# Patient Record
Sex: FEMALE | Race: OTHER | HISPANIC OR LATINO
[De-identification: names, ages, dates, MRNs, and addresses within clinical notes are randomized per-mention and may not be internally consistent; named-entity substitution may affect disease eponyms.]

---

## 2017-04-10 ENCOUNTER — TRANSCRIPTION ENCOUNTER (OUTPATIENT)
Age: 27
End: 2017-04-10

## 2017-08-29 ENCOUNTER — TRANSCRIPTION ENCOUNTER (OUTPATIENT)
Age: 27
End: 2017-08-29

## 2017-09-02 ENCOUNTER — TRANSCRIPTION ENCOUNTER (OUTPATIENT)
Age: 27
End: 2017-09-02

## 2018-01-01 ENCOUNTER — TRANSCRIPTION ENCOUNTER (OUTPATIENT)
Age: 28
End: 2018-01-01

## 2018-03-01 ENCOUNTER — OUTPATIENT (OUTPATIENT)
Dept: OUTPATIENT SERVICES | Facility: HOSPITAL | Age: 28
LOS: 1 days | End: 2018-03-01

## 2018-03-16 DIAGNOSIS — R69 ILLNESS, UNSPECIFIED: ICD-10-CM

## 2018-10-01 ENCOUNTER — OUTPATIENT (OUTPATIENT)
Dept: OUTPATIENT SERVICES | Facility: HOSPITAL | Age: 28
LOS: 1 days | End: 2018-10-01
Payer: MEDICAID

## 2018-10-01 PROCEDURE — G9001: CPT

## 2018-10-08 DIAGNOSIS — Z71.89 OTHER SPECIFIED COUNSELING: ICD-10-CM

## 2020-03-19 PROBLEM — Z00.00 ENCOUNTER FOR PREVENTIVE HEALTH EXAMINATION: Status: ACTIVE | Noted: 2020-03-19

## 2020-06-15 ENCOUNTER — APPOINTMENT (OUTPATIENT)
Dept: OBGYN | Facility: CLINIC | Age: 30
End: 2020-06-15
Payer: MEDICAID

## 2020-06-15 VITALS
HEIGHT: 61 IN | WEIGHT: 215 LBS | DIASTOLIC BLOOD PRESSURE: 76 MMHG | BODY MASS INDEX: 40.59 KG/M2 | RESPIRATION RATE: 16 BRPM | TEMPERATURE: 98.7 F | SYSTOLIC BLOOD PRESSURE: 118 MMHG

## 2020-06-15 DIAGNOSIS — Z87.42 PERSONAL HISTORY OF OTHER DISEASES OF THE FEMALE GENITAL TRACT: ICD-10-CM

## 2020-06-15 DIAGNOSIS — G43.909 MIGRAINE, UNSPECIFIED, NOT INTRACTABLE, W/OUT STATUS MIGRAINOSUS: ICD-10-CM

## 2020-06-15 DIAGNOSIS — Z12.72 ENCOUNTER FOR SCREENING FOR MALIGNANT NEOPLASM OF VAGINA: ICD-10-CM

## 2020-06-15 DIAGNOSIS — Z86.39 PERSONAL HISTORY OF OTHER ENDOCRINE, NUTRITIONAL AND METABOLIC DISEASE: ICD-10-CM

## 2020-06-15 DIAGNOSIS — Z86.79 PERSONAL HISTORY OF OTHER DISEASES OF THE CIRCULATORY SYSTEM: ICD-10-CM

## 2020-06-15 DIAGNOSIS — Z86.018 PERSONAL HISTORY OF OTHER BENIGN NEOPLASM: ICD-10-CM

## 2020-06-15 DIAGNOSIS — Z78.9 OTHER SPECIFIED HEALTH STATUS: ICD-10-CM

## 2020-06-15 PROCEDURE — 99385 PREV VISIT NEW AGE 18-39: CPT

## 2020-06-15 PROCEDURE — 99204 OFFICE O/P NEW MOD 45 MIN: CPT | Mod: 25

## 2020-06-15 NOTE — PHYSICAL EXAM
[Awake] : awake [Alert] : alert [Acute Distress] : no acute distress [Nipple Discharge] : no nipple discharge [Mass] : no breast mass [Axillary LAD] : no axillary lymphadenopathy [Soft] : soft [Oriented x3] : oriented to person, place, and time [Tender] : non tender [Normal] : uterus [No Bleeding] : there was no active vaginal bleeding [Tenderness] : tender [Enlarged ___ wks] : enlarged [unfilled] ~Uweeks [Uterine Adnexae] : were not tender and not enlarged

## 2020-06-27 ENCOUNTER — APPOINTMENT (OUTPATIENT)
Dept: MRI IMAGING | Facility: CLINIC | Age: 30
End: 2020-06-27
Payer: MEDICAID

## 2020-06-27 ENCOUNTER — OUTPATIENT (OUTPATIENT)
Dept: OUTPATIENT SERVICES | Facility: HOSPITAL | Age: 30
LOS: 1 days | End: 2020-06-27

## 2020-06-27 DIAGNOSIS — D25.1 INTRAMURAL LEIOMYOMA OF UTERUS: ICD-10-CM

## 2020-06-27 PROCEDURE — 72197 MRI PELVIS W/O & W/DYE: CPT | Mod: 26

## 2020-06-29 ENCOUNTER — APPOINTMENT (OUTPATIENT)
Dept: OBGYN | Facility: CLINIC | Age: 30
End: 2020-06-29
Payer: MEDICAID

## 2020-06-29 VITALS
SYSTOLIC BLOOD PRESSURE: 110 MMHG | TEMPERATURE: 98.6 F | WEIGHT: 215 LBS | HEIGHT: 61 IN | BODY MASS INDEX: 40.59 KG/M2 | RESPIRATION RATE: 16 BRPM | DIASTOLIC BLOOD PRESSURE: 70 MMHG

## 2020-06-29 PROCEDURE — 99215 OFFICE O/P EST HI 40 MIN: CPT | Mod: 57

## 2020-07-10 ENCOUNTER — TRANSCRIPTION ENCOUNTER (OUTPATIENT)
Age: 30
End: 2020-07-10

## 2020-09-08 ENCOUNTER — OUTPATIENT (OUTPATIENT)
Dept: OUTPATIENT SERVICES | Facility: HOSPITAL | Age: 30
LOS: 1 days | End: 2020-09-08
Payer: MEDICAID

## 2020-09-08 VITALS
WEIGHT: 216.93 LBS | DIASTOLIC BLOOD PRESSURE: 51 MMHG | HEIGHT: 61 IN | TEMPERATURE: 99 F | SYSTOLIC BLOOD PRESSURE: 112 MMHG | OXYGEN SATURATION: 98 % | RESPIRATION RATE: 16 BRPM | HEART RATE: 72 BPM

## 2020-09-08 DIAGNOSIS — Z98.891 HISTORY OF UTERINE SCAR FROM PREVIOUS SURGERY: Chronic | ICD-10-CM

## 2020-09-08 DIAGNOSIS — N80.0 ENDOMETRIOSIS OF UTERUS: ICD-10-CM

## 2020-09-08 DIAGNOSIS — Z01.818 ENCOUNTER FOR OTHER PREPROCEDURAL EXAMINATION: ICD-10-CM

## 2020-09-08 DIAGNOSIS — Z86.79 PERSONAL HISTORY OF OTHER DISEASES OF THE CIRCULATORY SYSTEM: Chronic | ICD-10-CM

## 2020-09-08 LAB
A1C WITH ESTIMATED AVERAGE GLUCOSE RESULT: 5.6 % — SIGNIFICANT CHANGE UP (ref 4–5.6)
ANION GAP SERPL CALC-SCNC: 6 MMOL/L — SIGNIFICANT CHANGE UP (ref 5–17)
APPEARANCE UR: CLEAR — SIGNIFICANT CHANGE UP
BASOPHILS # BLD AUTO: 0.06 K/UL — SIGNIFICANT CHANGE UP (ref 0–0.2)
BASOPHILS NFR BLD AUTO: 0.8 % — SIGNIFICANT CHANGE UP (ref 0–2)
BILIRUB UR-MCNC: NEGATIVE — SIGNIFICANT CHANGE UP
BUN SERPL-MCNC: 12 MG/DL — SIGNIFICANT CHANGE UP (ref 7–23)
CALCIUM SERPL-MCNC: 8.6 MG/DL — SIGNIFICANT CHANGE UP (ref 8.5–10.1)
CHLORIDE SERPL-SCNC: 107 MMOL/L — SIGNIFICANT CHANGE UP (ref 96–108)
CO2 SERPL-SCNC: 24 MMOL/L — SIGNIFICANT CHANGE UP (ref 22–31)
COLOR SPEC: YELLOW — SIGNIFICANT CHANGE UP
CREAT SERPL-MCNC: 0.64 MG/DL — SIGNIFICANT CHANGE UP (ref 0.5–1.3)
DIFF PNL FLD: ABNORMAL
EOSINOPHIL # BLD AUTO: 0.12 K/UL — SIGNIFICANT CHANGE UP (ref 0–0.5)
EOSINOPHIL NFR BLD AUTO: 1.6 % — SIGNIFICANT CHANGE UP (ref 0–6)
ESTIMATED AVERAGE GLUCOSE: 114 MG/DL — SIGNIFICANT CHANGE UP (ref 68–114)
GLUCOSE SERPL-MCNC: 82 MG/DL — SIGNIFICANT CHANGE UP (ref 70–99)
GLUCOSE UR QL: NEGATIVE MG/DL — SIGNIFICANT CHANGE UP
HCT VFR BLD CALC: 40.4 % — SIGNIFICANT CHANGE UP (ref 34.5–45)
HGB BLD-MCNC: 12.8 G/DL — SIGNIFICANT CHANGE UP (ref 11.5–15.5)
IMM GRANULOCYTES NFR BLD AUTO: 0.4 % — SIGNIFICANT CHANGE UP (ref 0–1.5)
KETONES UR-MCNC: NEGATIVE — SIGNIFICANT CHANGE UP
LEUKOCYTE ESTERASE UR-ACNC: NEGATIVE — SIGNIFICANT CHANGE UP
LYMPHOCYTES # BLD AUTO: 1.45 K/UL — SIGNIFICANT CHANGE UP (ref 1–3.3)
LYMPHOCYTES # BLD AUTO: 19.9 % — SIGNIFICANT CHANGE UP (ref 13–44)
MCHC RBC-ENTMCNC: 30 PG — SIGNIFICANT CHANGE UP (ref 27–34)
MCHC RBC-ENTMCNC: 31.7 GM/DL — LOW (ref 32–36)
MCV RBC AUTO: 94.6 FL — SIGNIFICANT CHANGE UP (ref 80–100)
MONOCYTES # BLD AUTO: 0.62 K/UL — SIGNIFICANT CHANGE UP (ref 0–0.9)
MONOCYTES NFR BLD AUTO: 8.5 % — SIGNIFICANT CHANGE UP (ref 2–14)
NEUTROPHILS # BLD AUTO: 5.01 K/UL — SIGNIFICANT CHANGE UP (ref 1.8–7.4)
NEUTROPHILS NFR BLD AUTO: 68.8 % — SIGNIFICANT CHANGE UP (ref 43–77)
NITRITE UR-MCNC: NEGATIVE — SIGNIFICANT CHANGE UP
PH UR: 7 — SIGNIFICANT CHANGE UP (ref 5–8)
PLATELET # BLD AUTO: 355 K/UL — SIGNIFICANT CHANGE UP (ref 150–400)
POTASSIUM SERPL-MCNC: 4.2 MMOL/L — SIGNIFICANT CHANGE UP (ref 3.5–5.3)
POTASSIUM SERPL-SCNC: 4.2 MMOL/L — SIGNIFICANT CHANGE UP (ref 3.5–5.3)
PROT UR-MCNC: NEGATIVE MG/DL — SIGNIFICANT CHANGE UP
RBC # BLD: 4.27 M/UL — SIGNIFICANT CHANGE UP (ref 3.8–5.2)
RBC # FLD: 14.1 % — SIGNIFICANT CHANGE UP (ref 10.3–14.5)
SODIUM SERPL-SCNC: 137 MMOL/L — SIGNIFICANT CHANGE UP (ref 135–145)
SP GR SPEC: 1.01 — SIGNIFICANT CHANGE UP (ref 1.01–1.02)
UROBILINOGEN FLD QL: NEGATIVE MG/DL — SIGNIFICANT CHANGE UP
WBC # BLD: 7.29 K/UL — SIGNIFICANT CHANGE UP (ref 3.8–10.5)
WBC # FLD AUTO: 7.29 K/UL — SIGNIFICANT CHANGE UP (ref 3.8–10.5)

## 2020-09-08 PROCEDURE — 85025 COMPLETE CBC W/AUTO DIFF WBC: CPT

## 2020-09-08 PROCEDURE — G0463: CPT | Mod: 25

## 2020-09-08 PROCEDURE — 86850 RBC ANTIBODY SCREEN: CPT

## 2020-09-08 PROCEDURE — 81001 URINALYSIS AUTO W/SCOPE: CPT

## 2020-09-08 PROCEDURE — 36415 COLL VENOUS BLD VENIPUNCTURE: CPT

## 2020-09-08 PROCEDURE — 80048 BASIC METABOLIC PNL TOTAL CA: CPT

## 2020-09-08 PROCEDURE — 83036 HEMOGLOBIN GLYCOSYLATED A1C: CPT

## 2020-09-08 PROCEDURE — 86901 BLOOD TYPING SEROLOGIC RH(D): CPT

## 2020-09-08 PROCEDURE — 86900 BLOOD TYPING SEROLOGIC ABO: CPT

## 2020-09-08 NOTE — H&P PST ADULT - HISTORY OF PRESENT ILLNESS
30 year old female [place]  with adenomyosis c/o menorrhagia and pelvic pain denies fever chills or change in bowel habits she presents to PST for planned hysterectomy

## 2020-09-08 NOTE — H&P PST ADULT - NSICDXFAMILYHX_GEN_ALL_CORE_FT
FAMILY HISTORY:  Family history of high cholesterol, mother  FH: heart disease, paternal grandfather  FH: hypertension, father

## 2020-09-08 NOTE — H&P PST ADULT - ASSESSMENT
30 year old female presents to PST for planned hysterectomy    Plan:  1. PST instructions given ; NPO status instructions to be given by ASU   2. Pt instructed to take following meds with sip of water : levothyroxine   3. Pt instructed to take routine evening medications unless indicated   4. Stop NSAIDS ( Aspirin Alev Motrin Mobic Diclofenac), herbal supplements , MVI , Vitamin fish oil 7 days prior to surgery  unless   directed by surgeon or cardiologist;   5. Urine for pregnancy on day of surgery   6. EZ wash instructions given   7. Labs EKG CXR as per surgeon request   8. Pt instructed to self quarantine   9. Covid Testing scheduled Pt notified and aware  10. Pt denies covid symptoms shortness of breath fever cough

## 2020-09-09 DIAGNOSIS — Z01.818 ENCOUNTER FOR OTHER PREPROCEDURAL EXAMINATION: ICD-10-CM

## 2020-09-09 DIAGNOSIS — N80.0 ENDOMETRIOSIS OF UTERUS: ICD-10-CM

## 2020-09-11 ENCOUNTER — APPOINTMENT (OUTPATIENT)
Dept: OBGYN | Facility: CLINIC | Age: 30
End: 2020-09-11
Payer: MEDICAID

## 2020-09-11 VITALS
WEIGHT: 216 LBS | SYSTOLIC BLOOD PRESSURE: 108 MMHG | BODY MASS INDEX: 40.78 KG/M2 | RESPIRATION RATE: 16 BRPM | TEMPERATURE: 97.6 F | DIASTOLIC BLOOD PRESSURE: 68 MMHG | HEIGHT: 61 IN

## 2020-09-11 DIAGNOSIS — R52 PAIN, UNSPECIFIED: ICD-10-CM

## 2020-09-11 DIAGNOSIS — N92.0 EXCESSIVE AND FREQUENT MENSTRUATION WITH REGULAR CYCLE: ICD-10-CM

## 2020-09-11 DIAGNOSIS — N80.0 ENDOMETRIOSIS OF UTERUS: ICD-10-CM

## 2020-09-11 DIAGNOSIS — D25.1 INTRAMURAL LEIOMYOMA OF UTERUS: ICD-10-CM

## 2020-09-11 DIAGNOSIS — Z01.818 ENCOUNTER FOR OTHER PREPROCEDURAL EXAMINATION: ICD-10-CM

## 2020-09-11 PROCEDURE — 99214 OFFICE O/P EST MOD 30 MIN: CPT

## 2020-09-11 NOTE — PHYSICAL EXAM
[Obese] : obese [No Mass] : no masses were palpated [Soft, Nontender] : the abdomen was soft and nontender [No HSM] : no hepatosplenomegaly noted [Normal Rate] : normal [Normal S1] : normal S1 [Normal S2] : normal S2 [No Murmur] : no murmurs heard [No Pitting Edema] : no pitting edema present [Normal Rhythm/Effort] : normal respiratory rhythm and effort [Arterial Pulses Equal At ___ Bilat (/N Is Sub: Default = /2)] : the peripheral pulses were 2+ and symmetric [Normal Carotids] : the carotid pulses were normal with no bruits [Normal to Percussion] : the lungs were normal to percussion [Clear Bilaterally] : the lungs were clear to auscultation bilaterally [Normal] : palpation of the chest was normal [S3] : no S3 [S4] : no S4

## 2020-09-13 ENCOUNTER — OUTPATIENT (OUTPATIENT)
Dept: OUTPATIENT SERVICES | Facility: HOSPITAL | Age: 30
LOS: 1 days | End: 2020-09-13
Payer: MEDICAID

## 2020-09-13 DIAGNOSIS — Z11.59 ENCOUNTER FOR SCREENING FOR OTHER VIRAL DISEASES: ICD-10-CM

## 2020-09-13 DIAGNOSIS — Z98.891 HISTORY OF UTERINE SCAR FROM PREVIOUS SURGERY: Chronic | ICD-10-CM

## 2020-09-13 LAB — SARS-COV-2 RNA SPEC QL NAA+PROBE: SIGNIFICANT CHANGE UP

## 2020-09-13 PROCEDURE — U0003: CPT

## 2020-09-14 DIAGNOSIS — Z11.59 ENCOUNTER FOR SCREENING FOR OTHER VIRAL DISEASES: ICD-10-CM

## 2020-09-15 RX ORDER — SODIUM CHLORIDE 9 MG/ML
3 INJECTION INTRAMUSCULAR; INTRAVENOUS; SUBCUTANEOUS EVERY 8 HOURS
Refills: 0 | Status: DISCONTINUED | OUTPATIENT
Start: 2020-09-16 | End: 2020-09-17

## 2020-09-15 RX ORDER — ONDANSETRON 8 MG/1
4 TABLET, FILM COATED ORAL EVERY 6 HOURS
Refills: 0 | Status: DISCONTINUED | OUTPATIENT
Start: 2020-09-16 | End: 2020-09-16

## 2020-09-15 RX ORDER — SODIUM CHLORIDE 9 MG/ML
1000 INJECTION, SOLUTION INTRAVENOUS
Refills: 0 | Status: DISCONTINUED | OUTPATIENT
Start: 2020-09-16 | End: 2020-09-16

## 2020-09-15 RX ORDER — FENTANYL CITRATE 50 UG/ML
50 INJECTION INTRAVENOUS
Refills: 0 | Status: DISCONTINUED | OUTPATIENT
Start: 2020-09-16 | End: 2020-09-16

## 2020-09-16 ENCOUNTER — OUTPATIENT (OUTPATIENT)
Dept: INPATIENT UNIT | Facility: HOSPITAL | Age: 30
LOS: 1 days | Discharge: HOME | End: 2020-09-16
Payer: MEDICAID

## 2020-09-16 ENCOUNTER — RESULT REVIEW (OUTPATIENT)
Age: 30
End: 2020-09-16

## 2020-09-16 ENCOUNTER — APPOINTMENT (OUTPATIENT)
Dept: OBGYN | Facility: HOSPITAL | Age: 30
End: 2020-09-16

## 2020-09-16 VITALS
RESPIRATION RATE: 16 BRPM | SYSTOLIC BLOOD PRESSURE: 110 MMHG | HEIGHT: 61 IN | HEART RATE: 56 BPM | DIASTOLIC BLOOD PRESSURE: 61 MMHG | TEMPERATURE: 98 F | WEIGHT: 216.93 LBS | OXYGEN SATURATION: 97 %

## 2020-09-16 DIAGNOSIS — N80.0 ENDOMETRIOSIS OF UTERUS: ICD-10-CM

## 2020-09-16 DIAGNOSIS — Z98.891 HISTORY OF UTERINE SCAR FROM PREVIOUS SURGERY: Chronic | ICD-10-CM

## 2020-09-16 DIAGNOSIS — N92.0 EXCESSIVE AND FREQUENT MENSTRUATION WITH REGULAR CYCLE: ICD-10-CM

## 2020-09-16 LAB
ALBUMIN SERPL ELPH-MCNC: 3.3 G/DL — SIGNIFICANT CHANGE UP (ref 3.3–5)
ALP SERPL-CCNC: 84 U/L — SIGNIFICANT CHANGE UP (ref 40–120)
ALT FLD-CCNC: 26 U/L — SIGNIFICANT CHANGE UP (ref 12–78)
ANION GAP SERPL CALC-SCNC: 6 MMOL/L — SIGNIFICANT CHANGE UP (ref 5–17)
AST SERPL-CCNC: 11 U/L — LOW (ref 15–37)
BILIRUB SERPL-MCNC: 0.2 MG/DL — SIGNIFICANT CHANGE UP (ref 0.2–1.2)
BUN SERPL-MCNC: 13 MG/DL — SIGNIFICANT CHANGE UP (ref 7–23)
CALCIUM SERPL-MCNC: 8.2 MG/DL — LOW (ref 8.5–10.1)
CHLORIDE SERPL-SCNC: 106 MMOL/L — SIGNIFICANT CHANGE UP (ref 96–108)
CO2 SERPL-SCNC: 26 MMOL/L — SIGNIFICANT CHANGE UP (ref 22–31)
CREAT SERPL-MCNC: 0.88 MG/DL — SIGNIFICANT CHANGE UP (ref 0.5–1.3)
GLUCOSE SERPL-MCNC: 135 MG/DL — HIGH (ref 70–99)
HCG UR QL: NEGATIVE — SIGNIFICANT CHANGE UP
HCT VFR BLD CALC: 38.1 % — SIGNIFICANT CHANGE UP (ref 34.5–45)
HGB BLD-MCNC: 12.3 G/DL — SIGNIFICANT CHANGE UP (ref 11.5–15.5)
MCHC RBC-ENTMCNC: 30.1 PG — SIGNIFICANT CHANGE UP (ref 27–34)
MCHC RBC-ENTMCNC: 32.3 GM/DL — SIGNIFICANT CHANGE UP (ref 32–36)
MCV RBC AUTO: 93.4 FL — SIGNIFICANT CHANGE UP (ref 80–100)
PLATELET # BLD AUTO: 386 K/UL — SIGNIFICANT CHANGE UP (ref 150–400)
POTASSIUM SERPL-MCNC: 4.1 MMOL/L — SIGNIFICANT CHANGE UP (ref 3.5–5.3)
POTASSIUM SERPL-SCNC: 4.1 MMOL/L — SIGNIFICANT CHANGE UP (ref 3.5–5.3)
PROT SERPL-MCNC: 7.5 GM/DL — SIGNIFICANT CHANGE UP (ref 6–8.3)
RBC # BLD: 4.08 M/UL — SIGNIFICANT CHANGE UP (ref 3.8–5.2)
RBC # FLD: 13.9 % — SIGNIFICANT CHANGE UP (ref 10.3–14.5)
SODIUM SERPL-SCNC: 138 MMOL/L — SIGNIFICANT CHANGE UP (ref 135–145)
WBC # BLD: 16.05 K/UL — HIGH (ref 3.8–10.5)
WBC # FLD AUTO: 16.05 K/UL — HIGH (ref 3.8–10.5)

## 2020-09-16 PROCEDURE — C1765: CPT

## 2020-09-16 PROCEDURE — C1889: CPT

## 2020-09-16 PROCEDURE — 82962 GLUCOSE BLOOD TEST: CPT

## 2020-09-16 PROCEDURE — S2900: CPT

## 2020-09-16 PROCEDURE — 88307 TISSUE EXAM BY PATHOLOGIST: CPT

## 2020-09-16 PROCEDURE — 58542 LSH W/T/O UT 250 G OR LESS: CPT | Mod: AS

## 2020-09-16 PROCEDURE — 80053 COMPREHEN METABOLIC PANEL: CPT

## 2020-09-16 PROCEDURE — 88307 TISSUE EXAM BY PATHOLOGIST: CPT | Mod: 26

## 2020-09-16 PROCEDURE — S2900 ROBOTIC SURGICAL SYSTEM: CPT | Mod: NC

## 2020-09-16 PROCEDURE — 58661 LAPAROSCOPY REMOVE ADNEXA: CPT | Mod: AS,59

## 2020-09-16 PROCEDURE — 85027 COMPLETE CBC AUTOMATED: CPT

## 2020-09-16 PROCEDURE — 36415 COLL VENOUS BLD VENIPUNCTURE: CPT

## 2020-09-16 PROCEDURE — 81025 URINE PREGNANCY TEST: CPT

## 2020-09-16 PROCEDURE — 58542 LSH W/T/O UT 250 G OR LESS: CPT

## 2020-09-16 PROCEDURE — 58661 LAPAROSCOPY REMOVE ADNEXA: CPT | Mod: 59

## 2020-09-16 RX ORDER — ACETAMINOPHEN 500 MG
1000 TABLET ORAL EVERY 6 HOURS
Refills: 0 | Status: DISCONTINUED | OUTPATIENT
Start: 2020-09-16 | End: 2020-09-17

## 2020-09-16 RX ORDER — PREGABALIN 225 MG/1
1 CAPSULE ORAL
Qty: 0 | Refills: 0 | DISCHARGE

## 2020-09-16 RX ORDER — ONDANSETRON 8 MG/1
4 TABLET, FILM COATED ORAL EVERY 6 HOURS
Refills: 0 | Status: DISCONTINUED | OUTPATIENT
Start: 2020-09-16 | End: 2020-09-17

## 2020-09-16 RX ORDER — OXYCODONE HYDROCHLORIDE 5 MG/1
10 TABLET ORAL EVERY 4 HOURS
Refills: 0 | Status: DISCONTINUED | OUTPATIENT
Start: 2020-09-16 | End: 2020-09-17

## 2020-09-16 RX ORDER — ACETAMINOPHEN 500 MG
2 TABLET ORAL
Qty: 0 | Refills: 0 | DISCHARGE
Start: 2020-09-16

## 2020-09-16 RX ORDER — HYDROMORPHONE HYDROCHLORIDE 2 MG/ML
0.2 INJECTION INTRAMUSCULAR; INTRAVENOUS; SUBCUTANEOUS
Refills: 0 | Status: DISCONTINUED | OUTPATIENT
Start: 2020-09-16 | End: 2020-09-17

## 2020-09-16 RX ORDER — SIMETHICONE 80 MG/1
80 TABLET, CHEWABLE ORAL EVERY 6 HOURS
Refills: 0 | Status: DISCONTINUED | OUTPATIENT
Start: 2020-09-16 | End: 2020-09-17

## 2020-09-16 RX ORDER — FAMOTIDINE 10 MG/ML
20 INJECTION INTRAVENOUS ONCE
Refills: 0 | Status: COMPLETED | OUTPATIENT
Start: 2020-09-16 | End: 2020-09-16

## 2020-09-16 RX ORDER — OXYCODONE HYDROCHLORIDE 5 MG/1
5 TABLET ORAL
Refills: 0 | Status: DISCONTINUED | OUTPATIENT
Start: 2020-09-16 | End: 2020-09-17

## 2020-09-16 RX ORDER — ONDANSETRON 8 MG/1
8 TABLET, FILM COATED ORAL EVERY 8 HOURS
Refills: 0 | Status: DISCONTINUED | OUTPATIENT
Start: 2020-09-16 | End: 2020-09-17

## 2020-09-16 RX ORDER — ACETAMINOPHEN 500 MG
975 TABLET ORAL ONCE
Refills: 0 | Status: COMPLETED | OUTPATIENT
Start: 2020-09-16 | End: 2020-09-16

## 2020-09-16 RX ORDER — CHOLECALCIFEROL (VITAMIN D3) 125 MCG
0 CAPSULE ORAL
Qty: 0 | Refills: 0 | DISCHARGE

## 2020-09-16 RX ORDER — LEVOTHYROXINE SODIUM 125 MCG
1 TABLET ORAL
Qty: 0 | Refills: 0 | DISCHARGE

## 2020-09-16 RX ORDER — OXYCODONE HYDROCHLORIDE 5 MG/1
5 TABLET ORAL ONCE
Refills: 0 | Status: DISCONTINUED | OUTPATIENT
Start: 2020-09-16 | End: 2020-09-16

## 2020-09-16 RX ADMIN — SODIUM CHLORIDE 3 MILLILITER(S): 9 INJECTION INTRAMUSCULAR; INTRAVENOUS; SUBCUTANEOUS at 14:44

## 2020-09-16 RX ADMIN — OXYCODONE HYDROCHLORIDE 5 MILLIGRAM(S): 5 TABLET ORAL at 12:45

## 2020-09-16 RX ADMIN — SODIUM CHLORIDE 3 MILLILITER(S): 9 INJECTION INTRAMUSCULAR; INTRAVENOUS; SUBCUTANEOUS at 21:02

## 2020-09-16 RX ADMIN — OXYCODONE HYDROCHLORIDE 10 MILLIGRAM(S): 5 TABLET ORAL at 15:18

## 2020-09-16 RX ADMIN — FENTANYL CITRATE 50 MICROGRAM(S): 50 INJECTION INTRAVENOUS at 13:00

## 2020-09-16 RX ADMIN — OXYCODONE HYDROCHLORIDE 10 MILLIGRAM(S): 5 TABLET ORAL at 21:15

## 2020-09-16 RX ADMIN — OXYCODONE HYDROCHLORIDE 10 MILLIGRAM(S): 5 TABLET ORAL at 14:48

## 2020-09-16 RX ADMIN — FAMOTIDINE 20 MILLIGRAM(S): 10 INJECTION INTRAVENOUS at 10:15

## 2020-09-16 RX ADMIN — ONDANSETRON 4 MILLIGRAM(S): 8 TABLET, FILM COATED ORAL at 20:10

## 2020-09-16 RX ADMIN — Medication 975 MILLIGRAM(S): at 10:15

## 2020-09-16 RX ADMIN — FENTANYL CITRATE 50 MICROGRAM(S): 50 INJECTION INTRAVENOUS at 13:10

## 2020-09-16 RX ADMIN — Medication 975 MILLIGRAM(S): at 10:16

## 2020-09-16 RX ADMIN — OXYCODONE HYDROCHLORIDE 10 MILLIGRAM(S): 5 TABLET ORAL at 20:47

## 2020-09-16 RX ADMIN — Medication 1000 MILLIGRAM(S): at 17:39

## 2020-09-16 RX ADMIN — Medication 1000 MILLIGRAM(S): at 17:09

## 2020-09-16 RX ADMIN — FENTANYL CITRATE 50 MICROGRAM(S): 50 INJECTION INTRAVENOUS at 12:45

## 2020-09-16 NOTE — ASU PREOP CHECKLIST - AS BP NONINV METHOD
Patient notified and verbalized understanding of result note and recommendations. Patient denies any additional questions at this time.    electronic

## 2020-09-16 NOTE — ASU PATIENT PROFILE, ADULT - CAREGIVER
Allergic Rhinitis  Allergic rhinitis is an allergic reaction that affects the nose, and often the eyes. It s often known as nasal allergies. Nasal allergies are often due to things in the environment that are breathed in. Depending what you are sensitive to, nasal allergies may occur only during certain seasons. Or they may occur year round. Common indoor allergens include house dust mites, mold, cockroaches, and pet dander. Outdoor allergens include pollen from trees, grasses, and weeds.   Symptoms include a drippy, stuffy, and itchy nose. They also include sneezing and red and itchy eyes. You may feel tired more often. Severe allergies may also affect your breathing and trigger a condition called asthma.   Tests can be done to see what allergens are affecting you. You may be referred to an allergy specialist for testing and further evaluation.  Home care  Your healthcare provider may prescribe medicines to help relieve allergy symptoms. These may include oral medicines, nasal sprays, or eye drops.  Ask your provider for advice on how to avoid substances that you are allergic to. Below are a few tips for each type of allergen.  Pet dander:    Do not have pets with fur and feathers.    If you can't avoid having a pet, keep it out of your bedroom and off upholstered furniture.  Pollen:    When pollen counts are high, keep windows of your car and home closed. If possible, use an air conditioner instead.    Wear a filter mask when mowing or doing yard work.  House dust mites:    Wash bedding every week in warm water and detergent and dry on a hot setting.    Cover the mattress, box spring, and pillows with allergy covers.     If possible, sleep in a room with no carpet, curtains, or upholstered furniture.  Cockroaches:    Store food in sealed containers.    Remove garbage from the home promptly.    Fix water leaks  Mold:    Keep humidity low by using a dehumidifier or air conditioner. Keep the dehumidifier and air  conditioner clean and free of mold.    Clean moldy areas with bleach and water.  In general:    Vacuum once or twice a week. If possible, use a vacuum with a high-efficiency particulate air (HEPA) filter.    Do not smoke. Avoid cigarette smoke. Cigarette smoke is an irritant that can make symptoms worse.  Follow-up care  Follow up as advised by the healthcare provider or our staff. If you were referred to an allergy specialist, make this appointment promptly.  When to seek medical advice  Call your healthcare provider right away if the following occur:    Coughing or wheezing    Fever of 100.4 F (38 C) or higher, or as directed by your healthcare provider    Raised red bumps (hives)    Continuing symptoms, new symptoms, or worsening symptoms  Call 911 if you have:    Trouble breathing    Severe swelling of the face or severe itching of the eyes or mouth  Date Last Reviewed: 3/1/2017    7654-6920 The "[employer] [address] [place] [address]392. All rights reserved. This information is not intended as a substitute for professional medical care. Always follow your healthcare professional's instructions.         Yes

## 2020-09-16 NOTE — ASU DISCHARGE PLAN (ADULT/PEDIATRIC) - CARE PROVIDER_API CALL
Johnny Hensley)  Obstetrics and Gynecology  [address] [place] [id number]  Phone: [phone]  Fax: [phone]  Follow Up Time:

## 2020-09-16 NOTE — BRIEF OPERATIVE NOTE - NSICDXBRIEFPREOP_GEN_ALL_CORE_FT
PRE-OP DIAGNOSIS:  Pelvic pain 16-Sep-2020 12:07:36  Johnny Hensley  Menorrhagia 16-Sep-2020 12:07:25  Johnny Hensley

## 2020-09-16 NOTE — BRIEF OPERATIVE NOTE - NSICDXBRIEFPOSTOP_GEN_ALL_CORE_FT
POST-OP DIAGNOSIS:  Endometriosis of pelvis 16-Sep-2020 12:08:17  Johnny Hensley  Pelvic pain 16-Sep-2020 12:08:00  Johnny Hensley  Menorrhagia 16-Sep-2020 12:07:51  Johnny Hensley

## 2020-09-16 NOTE — BRIEF OPERATIVE NOTE - NSICDXBRIEFPROCEDURE_GEN_ALL_CORE_FT
PROCEDURES:  Lysis of pelvic adhesions 16-Sep-2020 12:07:02  Johnny Hensley  Right salpingectomy 16-Sep-2020 12:06:52  Johnny Hensley  Left salpingoophorectomy 16-Sep-2020 12:06:43  Johnny Hensley  Supracervical hysterectomy 16-Sep-2020 12:06:30  Johnny Hensley

## 2020-09-17 VITALS
DIASTOLIC BLOOD PRESSURE: 44 MMHG | TEMPERATURE: 98 F | HEART RATE: 74 BPM | OXYGEN SATURATION: 95 % | RESPIRATION RATE: 18 BRPM | SYSTOLIC BLOOD PRESSURE: 114 MMHG

## 2020-09-17 RX ADMIN — SODIUM CHLORIDE 3 MILLILITER(S): 9 INJECTION INTRAMUSCULAR; INTRAVENOUS; SUBCUTANEOUS at 05:25

## 2020-09-17 RX ADMIN — Medication 1000 MILLIGRAM(S): at 05:29

## 2020-09-17 RX ADMIN — OXYCODONE HYDROCHLORIDE 5 MILLIGRAM(S): 5 TABLET ORAL at 09:47

## 2020-09-17 RX ADMIN — Medication 1000 MILLIGRAM(S): at 11:34

## 2020-09-17 RX ADMIN — Medication 1000 MILLIGRAM(S): at 00:31

## 2020-09-17 RX ADMIN — Medication 1000 MILLIGRAM(S): at 12:04

## 2020-09-17 RX ADMIN — Medication 1000 MILLIGRAM(S): at 05:32

## 2020-09-17 RX ADMIN — OXYCODONE HYDROCHLORIDE 5 MILLIGRAM(S): 5 TABLET ORAL at 15:46

## 2020-09-17 RX ADMIN — Medication 1000 MILLIGRAM(S): at 00:01

## 2020-09-17 NOTE — PROVIDER CONTACT NOTE (OTHER) - SITUATION
Spoke with Nicole from MD'S office, She will inform pcp, patient was admitted to [facility].

## 2020-09-17 NOTE — PROGRESS NOTE ADULT - SUBJECTIVE AND OBJECTIVE BOX
Name: ALYSE STREETER  MRN: [id number]  Date Admitted: 09-16-20  Location: [facility] 116 02 ([facility])  Attending: Johnny Hensley  All: No Known Allergies    Progress Note    ALYSE STREETER is a 30y s/p supracervical hysterectomy, bilateral salpingectomy, left oophorectomy, and lysis of adhesions, POD #1, HD #2.    SUBJECTIVE:    Patient was seen and examined at bedside. No acute events overnight. Pt reports pain is well controlled with PRN pain medication. Tolerates PO intake without N/V. Hernandes has been removed and patient has been urinating. Denies flatus or BM. No problems with ambulation.   Denies fevers, chills, chest pain or shortness of breath.     OBJECTIVE:   T(C): 37.1 (09-17-20 @ 09:00), Max: 37.1 (09-17-20 @ 04:12)  T(F): 98.7 (09-17-20 @ 09:00), Max: 98.8 (09-17-20 @ 04:12)  HR: 89 (09-17-20 @ 09:00) (72 - 89)  BP: 101/51 (09-17-20 @ 09:00) (101/51 - 119/57)  RR: 18 (09-17-20 @ 09:00) (16 - 18)  SpO2: 92% (09-17-20 @ 04:12) (91% - 94%)    Physical Exam:  Gen: NAD, AOx3  CV: S1S2, RRR  Lungs: CTABL, no WRR. Speaking in full sentences without SOB.  Abd: Soft, appropriately tender, non-distended, no guarding or rebound tenderness. Incisions c/d/i with dermabond, no erythema or discharge  Ext: No calf tenderness bilaterally, Eleuterio's Sign negative bilaterally.   : No active vaginal bleeding.    LABS:  Complete Blood Count (09.16.20 @ 16:39)    WBC Count: 16.05 K/uL    RBC Count: 4.08 M/uL    Hemoglobin: 12.3 g/dL    Hematocrit: 38.1 %    Mean Cell Volume: 93.4 fl    Mean Cell Hemoglobin: 30.1 pg    Mean Cell Hemoglobin Conc: 32.3 gm/dL    Red Cell Distrib Width: 13.9 %    Platelet Count - Automated: 386 K/uL    09-16-20 @ 07:01  -  09-17-20 @ 07:00  --------------------------------------------------------  IN: 0 mL / OUT: 510 mL / NET: -510 mL    [facility] Meds:  acetaminophen   Tablet .. 1000 milliGRAM(s) Oral every 6 hours  acetaminophen   Tablet .. 1000 milliGRAM(s) Oral every 6 hours PRN  HYDROmorphone  Injectable 0.2 milliGRAM(s) IV Push every 2 hours PRN  ondansetron    Tablet 8 milliGRAM(s) Oral every 8 hours PRN  ondansetron Injectable 4 milliGRAM(s) IV Push every 6 hours PRN  oxyCODONE    IR 5 milliGRAM(s) Oral every 3 hours PRN  oxyCODONE    IR 10 milliGRAM(s) Oral every 4 hours PRN  simethicone 80 milliGRAM(s) Chew every 6 hours PRN  sodium chloride 0.9% lock flush 3 milliLiter(s) IV Push every 8 hours

## 2020-09-17 NOTE — PROGRESS NOTE ADULT - ASSESSMENT
30y s/p supracervical hysterectomy, bilateral salpingectomy, left oophorectomy, and lysis of adhesions, POD #1, HD #2.  Post-op CBC reviewed and WNL. VSS.    - Continue routine post-op care.  - Encourage incentive spirometry.  - SCDs for DVT ppx.  - Advance diet as tolerated.  - DC today    d/w Dr. Hensley

## 2020-09-23 DIAGNOSIS — N83.02 FOLLICULAR CYST OF LEFT OVARY: ICD-10-CM

## 2020-09-23 DIAGNOSIS — N73.6 FEMALE PELVIC PERITONEAL ADHESIONS (POSTINFECTIVE): ICD-10-CM

## 2020-09-23 DIAGNOSIS — N80.0 ENDOMETRIOSIS OF UTERUS: ICD-10-CM

## 2020-09-23 DIAGNOSIS — N80.3 ENDOMETRIOSIS OF PELVIC PERITONEUM: ICD-10-CM

## 2020-09-23 DIAGNOSIS — E66.9 OBESITY, UNSPECIFIED: ICD-10-CM

## 2020-09-23 DIAGNOSIS — N92.0 EXCESSIVE AND FREQUENT MENSTRUATION WITH REGULAR CYCLE: ICD-10-CM

## 2020-09-23 DIAGNOSIS — E03.9 HYPOTHYROIDISM, UNSPECIFIED: ICD-10-CM

## 2020-09-23 DIAGNOSIS — R10.2 PELVIC AND PERINEAL PAIN: ICD-10-CM

## 2020-09-29 ENCOUNTER — APPOINTMENT (OUTPATIENT)
Dept: OBGYN | Facility: CLINIC | Age: 30
End: 2020-09-29
Payer: MEDICAID

## 2020-09-29 VITALS
HEIGHT: 61 IN | WEIGHT: 216 LBS | BODY MASS INDEX: 40.78 KG/M2 | SYSTOLIC BLOOD PRESSURE: 122 MMHG | RESPIRATION RATE: 16 BRPM | TEMPERATURE: 97.5 F | DIASTOLIC BLOOD PRESSURE: 78 MMHG

## 2020-09-29 PROCEDURE — 99024 POSTOP FOLLOW-UP VISIT: CPT

## 2020-09-29 NOTE — PLAN
[FreeTextEntry1] : PHYSICAL EXAMINATION PERFORMED. SURGICAL INCISIONS WERE CLEAN AND HEALING WELL. . THE PATHOLOGY WAS REVIEWED. POSTOPERATIVE INSTRUCTIONS, RESTRICTIONS AND LIMITATIONS DISCUSSED. FOLLOWUP EXAM SCHEDULED

## 2020-09-29 NOTE — PHYSICAL EXAM
[Soft] : soft [Non-tender] : non-tender [Non-distended] : non-distended [No HSM] : No HSM [FreeTextEntry7] : incisions healing well

## 2020-09-30 PROBLEM — N80.0 ENDOMETRIOSIS OF UTERUS: Chronic | Status: ACTIVE | Noted: 2020-09-08

## 2020-09-30 PROBLEM — E66.9 OBESITY, UNSPECIFIED: Chronic | Status: ACTIVE | Noted: 2020-09-08

## 2020-09-30 PROBLEM — N92.0 EXCESSIVE AND FREQUENT MENSTRUATION WITH REGULAR CYCLE: Chronic | Status: ACTIVE | Noted: 2020-09-08

## 2020-09-30 PROBLEM — E03.9 HYPOTHYROIDISM, UNSPECIFIED: Chronic | Status: ACTIVE | Noted: 2020-09-08

## 2020-10-26 ENCOUNTER — APPOINTMENT (OUTPATIENT)
Dept: OBGYN | Facility: CLINIC | Age: 30
End: 2020-10-26
Payer: MEDICAID

## 2020-10-26 VITALS
TEMPERATURE: 98.1 F | WEIGHT: 216 LBS | HEIGHT: 61 IN | DIASTOLIC BLOOD PRESSURE: 78 MMHG | BODY MASS INDEX: 40.78 KG/M2 | RESPIRATION RATE: 16 BRPM | SYSTOLIC BLOOD PRESSURE: 108 MMHG

## 2020-10-26 DIAGNOSIS — Z09 ENCOUNTER FOR FOLLOW-UP EXAMINATION AFTER COMPLETED TREATMENT FOR CONDITIONS OTHER THAN MALIGNANT NEOPLASM: ICD-10-CM

## 2020-10-26 PROCEDURE — 99024 POSTOP FOLLOW-UP VISIT: CPT

## 2020-10-26 NOTE — PHYSICAL EXAM
[Soft] : soft [Non-tender] : non-tender [Non-distended] : non-distended [No HSM] : No HSM [No Mass] : no mass [Labia Minora] : normal [Enlarged] : enlarged [Normal] : normal [Absent] : absent [FreeTextEntry7] : INCISIONS HEALING WELL.

## 2020-10-26 NOTE — PLAN
[FreeTextEntry1] : PATIENT PRESENTS FOR POSTOP VISIT. DISCUSSED POSTOP RESTRICTIONS AND LIMITATIONS. ADVISED TO CALL WITH ANY UNEXPECTED BLEEDING OR PAIN. PATIENT TO FOLLOWUP IN 3 MONTHS TIME

## 2020-12-31 ENCOUNTER — TRANSCRIPTION ENCOUNTER (OUTPATIENT)
Age: 30
End: 2020-12-31

## 2021-01-01 NOTE — COUNSELING
Goal Outcome Evaluation:  Plan of Care Reviewed With: patient  Progress: no change  Outcome Summary: Pt denies pain. Left nasal packing left in. Slight oozing of blood from left nare at times, refused to leave moustache dressing in place. O2 on per face tent. Up in chair this shift   [Pre/Post Op Instructions] : pre/post op instructions

## 2021-01-07 ENCOUNTER — TRANSCRIPTION ENCOUNTER (OUTPATIENT)
Age: 31
End: 2021-01-07

## 2021-03-05 ENCOUNTER — APPOINTMENT (OUTPATIENT)
Dept: OBGYN | Facility: CLINIC | Age: 31
End: 2021-03-05
Payer: MEDICAID

## 2021-03-05 VITALS
WEIGHT: 225 LBS | HEIGHT: 61 IN | SYSTOLIC BLOOD PRESSURE: 110 MMHG | TEMPERATURE: 98 F | DIASTOLIC BLOOD PRESSURE: 80 MMHG | BODY MASS INDEX: 42.48 KG/M2

## 2021-03-05 DIAGNOSIS — N91.1 SECONDARY AMENORRHEA: ICD-10-CM

## 2021-03-05 PROCEDURE — 99072 ADDL SUPL MATRL&STAF TM PHE: CPT

## 2021-03-05 PROCEDURE — 99212 OFFICE O/P EST SF 10 MIN: CPT

## 2021-03-05 NOTE — PLAN
[FreeTextEntry1] : PT DOING WELL AT FOLLOWUP. NO PAIN, NO BLEEDING. INCISIONS WELL HEALED. FOLLOWUP FOR ANNUAL VISIT

## 2021-03-05 NOTE — PHYSICAL EXAM
[Appropriately responsive] : appropriately responsive [Alert] : alert [No Acute Distress] : no acute distress [No Lymphadenopathy] : no lymphadenopathy [Regular Rate Rhythm] : regular rate rhythm [No Murmurs] : no murmurs [Clear to Auscultation B/L] : clear to auscultation bilaterally [Soft] : soft [Non-tender] : non-tender [Non-distended] : non-distended [No HSM] : No HSM [No Lesions] : no lesions [No Mass] : no mass [Oriented x3] : oriented x3 [FreeTextEntry7] : INCISIONS HEALING WELL [Labia Majora] : normal [Labia Minora] : normal [Normal] : normal [Absent] : absent [Uterine Adnexae] : normal

## 2021-03-16 NOTE — ASU DISCHARGE PLAN (ADULT/PEDIATRIC) - PROCEDURE
supracervical hysterectomy, bilateral salpingectomy, left oophorectomy, lysis of adhesions Mastoid Interpolation Flap Text: A decision was made to reconstruct the defect utilizing an interpolation axial flap and a staged reconstruction.  A telfa template was made of the defect.  This telfa template was then used to outline the mastoid interpolation flap.  The donor area for the pedicle flap was then injected with anesthesia.  The flap was excised through the skin and subcutaneous tissue down to the layer of the underlying musculature.  The pedicle flap was carefully excised within this deep plane to maintain its blood supply.  The edges of the donor site were undermined.   The donor site was closed in a primary fashion.  The pedicle was then rotated into position and sutured.  Once the tube was sutured into place, adequate blood supply was confirmed with blanching and refill.  The pedicle was then wrapped with xeroform gauze and dressed appropriately with a telfa and gauze bandage to ensure continued blood supply and protect the attached pedicle.

## 2021-06-18 ENCOUNTER — APPOINTMENT (OUTPATIENT)
Dept: OBGYN | Facility: CLINIC | Age: 31
End: 2021-06-18
Payer: MEDICAID

## 2021-06-18 ENCOUNTER — NON-APPOINTMENT (OUTPATIENT)
Age: 31
End: 2021-06-18

## 2021-06-18 VITALS
HEART RATE: 74 BPM | RESPIRATION RATE: 16 BRPM | BODY MASS INDEX: 40.78 KG/M2 | DIASTOLIC BLOOD PRESSURE: 60 MMHG | WEIGHT: 216 LBS | SYSTOLIC BLOOD PRESSURE: 112 MMHG | TEMPERATURE: 97.8 F | HEIGHT: 61 IN

## 2021-06-18 DIAGNOSIS — R35.0 FREQUENCY OF MICTURITION: ICD-10-CM

## 2021-06-18 DIAGNOSIS — Z01.419 ENCOUNTER FOR GYNECOLOGICAL EXAMINATION (GENERAL) (ROUTINE) W/OUT ABNORMAL FINDINGS: ICD-10-CM

## 2021-06-18 LAB
BILIRUB UR QL STRIP: NORMAL
CLARITY UR: CLEAR
COLLECTION METHOD: NORMAL
GLUCOSE UR-MCNC: NORMAL
HCG UR QL: 0.2 EU/DL
HGB UR QL STRIP.AUTO: NORMAL
KETONES UR-MCNC: NORMAL
LEUKOCYTE ESTERASE UR QL STRIP: NORMAL
NITRITE UR QL STRIP: NORMAL
PH UR STRIP: 7
PROT UR STRIP-MCNC: NORMAL
SP GR UR STRIP: 1.01

## 2021-06-18 PROCEDURE — 99395 PREV VISIT EST AGE 18-39: CPT

## 2021-06-18 PROCEDURE — 99213 OFFICE O/P EST LOW 20 MIN: CPT | Mod: 25

## 2021-06-18 PROCEDURE — 81003 URINALYSIS AUTO W/O SCOPE: CPT | Mod: QW

## 2021-06-18 NOTE — HISTORY OF PRESENT ILLNESS
[FreeTextEntry1] : PT PRESENTS FOR ANNUAL VISIT. DISCUSSED HISTORY OF ENDOMETRIOSIS. DISCUSSED URINARY FREQUENCY

## 2021-07-26 ENCOUNTER — TRANSCRIPTION ENCOUNTER (OUTPATIENT)
Age: 31
End: 2021-07-26

## 2021-10-11 ENCOUNTER — APPOINTMENT (OUTPATIENT)
Dept: OBGYN | Facility: CLINIC | Age: 31
End: 2021-10-11

## 2022-01-11 ENCOUNTER — APPOINTMENT (OUTPATIENT)
Dept: PULMONOLOGY | Facility: CLINIC | Age: 32
End: 2022-01-11
Payer: MEDICAID

## 2022-01-11 VITALS
HEIGHT: 61 IN | SYSTOLIC BLOOD PRESSURE: 128 MMHG | HEART RATE: 83 BPM | DIASTOLIC BLOOD PRESSURE: 76 MMHG | RESPIRATION RATE: 16 BRPM | BODY MASS INDEX: 40.59 KG/M2 | OXYGEN SATURATION: 95 % | WEIGHT: 215 LBS

## 2022-01-11 DIAGNOSIS — E03.9 HYPOTHYROIDISM, UNSPECIFIED: ICD-10-CM

## 2022-01-11 DIAGNOSIS — J45.909 UNSPECIFIED ASTHMA, UNCOMPLICATED: ICD-10-CM

## 2022-01-11 DIAGNOSIS — Z82.49 FAMILY HISTORY OF ISCHEMIC HEART DISEASE AND OTHER DISEASES OF THE CIRCULATORY SYSTEM: ICD-10-CM

## 2022-01-11 DIAGNOSIS — Z82.61 FAMILY HISTORY OF ARTHRITIS: ICD-10-CM

## 2022-01-11 DIAGNOSIS — Z22.7 LATENT TUBERCULOSIS: ICD-10-CM

## 2022-01-11 DIAGNOSIS — Z82.5 FAMILY HISTORY OF ASTHMA AND OTHER CHRONIC LOWER RESPIRATORY DISEASES: ICD-10-CM

## 2022-01-11 PROCEDURE — 99204 OFFICE O/P NEW MOD 45 MIN: CPT

## 2022-01-11 RX ORDER — OXYCODONE AND ACETAMINOPHEN 5; 325 MG/1; MG/1
5-325 TABLET ORAL EVERY 4 HOURS
Qty: 30 | Refills: 0 | Status: DISCONTINUED | COMMUNITY
Start: 2020-09-11 | End: 2022-01-11

## 2022-01-11 RX ORDER — OXYBUTYNIN CHLORIDE 10 MG/1
10 TABLET, EXTENDED RELEASE ORAL
Qty: 30 | Refills: 3 | Status: DISCONTINUED | COMMUNITY
Start: 2021-06-18 | End: 2022-01-11

## 2022-01-11 RX ORDER — LEVOTHYROXINE SODIUM 0.17 MG/1
TABLET ORAL
Refills: 0 | Status: ACTIVE | COMMUNITY

## 2022-01-11 NOTE — HISTORY OF PRESENT ILLNESS
[Never] : never [TextBox_4] : 31-year-old female with a recent history of latent tuberculosis diagnosed 3 months ago and currently undergoing INH therapy.  Patient is seen today not for this but for complaints of cough x3 years.  She has a history of childhood asthma which resolved approximately at the age of 8 and has never smoked.  She states that her cough began approximately 3 years ago without associated shortness of breath.  Her cough occurs predominantly at night and only intermittently during the day productive of only a scant white sputum.  Symptoms are sometimes associated with frontal and maxillary pain as well as postnasal drip and upper airway irritation.  Seasonally it appears worse in the winter.  Is been no change in home or work environment.\par \par Patient does note some relief with the use of a short acting bronchodilator

## 2022-01-11 NOTE — DISCUSSION/SUMMARY
[FreeTextEntry1] : 31-year-old female seen today for the above.  Patient has complaints of cough most likely on the basis of chronic sinusitis with postnasal drip.  Questionable asthmatic component especially with her response to short acting bronchodilators at times.

## 2022-01-11 NOTE — CONSULT LETTER
[Dear  ___] : Dear  [unfilled], [Consult Letter:] : I had the pleasure of evaluating your patient, [unfilled]. [Please see my note below.] : Please see my note below. [Consult Closing:] : Thank you very much for allowing me to participate in the care of this patient.  If you have any questions, please do not hesitate to contact me. [Sincerely,] : Sincerely, [FreeTextEntry3] : Zelalem Burch MD FCCP\par Pulmonary/Critical Care/Sleep Medicine\par Department of Internal Medicine\par \par [facility] [employer]\par

## 2022-02-22 LAB — SARS-COV-2 N GENE NPH QL NAA+PROBE: NOT DETECTED

## 2022-02-25 ENCOUNTER — APPOINTMENT (OUTPATIENT)
Dept: PULMONOLOGY | Facility: CLINIC | Age: 32
End: 2022-02-25
Payer: MEDICAID

## 2022-02-25 VITALS — BODY MASS INDEX: 40.4 KG/M2 | WEIGHT: 214 LBS | HEIGHT: 61 IN

## 2022-02-25 VITALS
SYSTOLIC BLOOD PRESSURE: 124 MMHG | DIASTOLIC BLOOD PRESSURE: 72 MMHG | HEART RATE: 71 BPM | OXYGEN SATURATION: 96 % | RESPIRATION RATE: 16 BRPM

## 2022-02-25 DIAGNOSIS — R05.3 CHRONIC COUGH: ICD-10-CM

## 2022-02-25 PROCEDURE — 94727 GAS DIL/WSHOT DETER LNG VOL: CPT

## 2022-02-25 PROCEDURE — 99214 OFFICE O/P EST MOD 30 MIN: CPT | Mod: 25

## 2022-02-25 PROCEDURE — 94729 DIFFUSING CAPACITY: CPT

## 2022-02-25 PROCEDURE — 94010 BREATHING CAPACITY TEST: CPT

## 2022-02-25 PROCEDURE — 85018 HEMOGLOBIN: CPT | Mod: QW

## 2022-02-25 NOTE — CONSULT LETTER
[Dear  ___] : Dear  [unfilled], [Consult Letter:] : I had the pleasure of evaluating your patient, [unfilled]. [Please see my note below.] : Please see my note below. [Consult Closing:] : Thank you very much for allowing me to participate in the care of this patient.  If you have any questions, please do not hesitate to contact me. [Sincerely,] : Sincerely, [FreeTextEntry3] : Zelalem Burch MD FCCP\par Pulmonary/Critical Care/Sleep Medicine\par Department of Internal Medicine\par \par [facility]\par

## 2022-02-25 NOTE — HISTORY OF PRESENT ILLNESS
[Never] : never [TextBox_4] : 1/11/2022:\par \par 31-year-old female with a recent history of latent tuberculosis diagnosed 3 months ago and currently undergoing INH therapy.  Patient is seen today not for this but for complaints of cough x3 years.  She has a history of childhood asthma which resolved approximately at the age of 8 and has never smoked.  She states that her cough began approximately 3 years ago without associated shortness of breath.  Her cough occurs predominantly at night and only intermittently during the day productive of only a scant white sputum.  Symptoms are sometimes associated with frontal and maxillary pain as well as postnasal drip and upper airway irritation.  Seasonally it appears worse in the winter.  Is been no change in home or work environment.\par \par Patient does note some relief with the use of a short acting bronchodilator.\par \par Patient's symptoms were felt to be on the basis of chronic sinusitis with a questionable asthmatic component.  She was placed on nasal steroids and antihistamines as well as as needed Ventolin\par \par 2/25/2022:\par Patient has been compliant with near complete resolution of symptoms.  She is using her Ventolin twice a day and notes a significant response each time.

## 2022-02-25 NOTE — DISCUSSION/SUMMARY
[Asthma] : asthma [Improving] : improving [Mild Persistent] : mild persistent [Medication Changes Per Orders] : Medication changes are as documented in orders [Chronic Sinusitis] : chronic sinusitis [Moderate] : moderate in severity [Responding to Treatment] : responding to treatment [Sinus Irrigation] : sinus irrigation [de-identified] : may wean off nasal antihistamines /ICS

## 2022-05-24 ENCOUNTER — APPOINTMENT (OUTPATIENT)
Dept: OBGYN | Facility: CLINIC | Age: 32
End: 2022-05-24
Payer: MEDICAID

## 2022-05-24 VITALS
BODY MASS INDEX: 40.4 KG/M2 | HEIGHT: 61 IN | SYSTOLIC BLOOD PRESSURE: 126 MMHG | DIASTOLIC BLOOD PRESSURE: 72 MMHG | WEIGHT: 214 LBS

## 2022-05-24 PROCEDURE — 99213 OFFICE O/P EST LOW 20 MIN: CPT

## 2022-05-24 NOTE — CHIEF COMPLAINT
[Urgent Visit] : Urgent Visit [FreeTextEntry1] : PT WITH PAIN. HX OF ENDOMETRIOSIS. HAD HYSTERECTOMY

## 2022-05-31 ENCOUNTER — APPOINTMENT (OUTPATIENT)
Dept: PULMONOLOGY | Facility: CLINIC | Age: 32
End: 2022-05-31
Payer: MEDICAID

## 2022-05-31 VITALS
RESPIRATION RATE: 16 BRPM | WEIGHT: 210 LBS | HEART RATE: 84 BPM | SYSTOLIC BLOOD PRESSURE: 128 MMHG | DIASTOLIC BLOOD PRESSURE: 82 MMHG | OXYGEN SATURATION: 95 % | HEIGHT: 61 IN | BODY MASS INDEX: 39.65 KG/M2

## 2022-05-31 PROCEDURE — 99214 OFFICE O/P EST MOD 30 MIN: CPT

## 2022-05-31 RX ORDER — ISONIAZID 300 MG/1
300 TABLET ORAL
Refills: 0 | Status: DISCONTINUED | COMMUNITY
End: 2022-05-31

## 2022-05-31 NOTE — CONSULT LETTER
[Dear  ___] : Dear  [unfilled], [Consult Letter:] : I had the pleasure of evaluating your patient, [unfilled]. [Please see my note below.] : Please see my note below. [Consult Closing:] : Thank you very much for allowing me to participate in the care of this patient.  If you have any questions, please do not hesitate to contact me. [Sincerely,] : Sincerely, [FreeTextEntry3] : Zelalem Burch MD FCCP\par Pulmonary/Critical Care/Sleep Medicine\par Department of Internal Medicine\par \par [facility]\par

## 2022-05-31 NOTE — HISTORY OF PRESENT ILLNESS
[Mild Persistent] : mild persistent [Doing Well] : doing well [Well Controlled] : Well controlled [None] : The patient is currently asymptomatic [___ Times a Week] : of [unfilled] time(s) a week [Adherent] : the patient is adherent with ~his/her~ medication regimen

## 2022-05-31 NOTE — DISCUSSION/SUMMARY
[Asthma] : asthma [Improving] : improving [Responding to Treatment] : responding to treatment [Mild Persistent] : mild persistent [None] : There are no changes in medication management [Patient] : the patient

## 2022-06-21 ENCOUNTER — APPOINTMENT (OUTPATIENT)
Dept: OBGYN | Facility: CLINIC | Age: 32
End: 2022-06-21
Payer: MEDICAID

## 2022-06-21 VITALS
SYSTOLIC BLOOD PRESSURE: 124 MMHG | WEIGHT: 210 LBS | BODY MASS INDEX: 39.65 KG/M2 | HEIGHT: 61 IN | DIASTOLIC BLOOD PRESSURE: 80 MMHG

## 2022-06-21 DIAGNOSIS — R10.2 PELVIC AND PERINEAL PAIN: ICD-10-CM

## 2022-06-21 PROCEDURE — 76830 TRANSVAGINAL US NON-OB: CPT

## 2022-06-21 PROCEDURE — 93976 VASCULAR STUDY: CPT

## 2022-06-21 PROCEDURE — 99214 OFFICE O/P EST MOD 30 MIN: CPT | Mod: 25

## 2022-06-21 NOTE — PLAN
[FreeTextEntry1] : PT WITH GOOD CLINICAL RESPONSE TO ORILISSA. WILL ATTEMPT AGAIN TO GET AUTHORIZATION. IF NOT WILL GIVE CONSIDERATION TO OTHER MEDS.

## 2022-06-21 NOTE — PROCEDURE
[Pelvic Pain] : pelvic pain [Transvaginal Ultrasound] : transvaginal ultrasound [Color Doppler Imaging] : color doppler imaging [Absent] : absent [FreeTextEntry3] : CERVIX NOTED TO BE IN SITU. NO FREE FLUID NOTED.  [FreeTextEntry7] : 3.54 X 2.71 [FreeTextEntry8] : NOT SEEN [FreeTextEntry4] : UNREMARKABLE GYN SONOGRAM. NO EVIDENCE OF A ENDOMETRIOMA

## 2022-06-21 NOTE — PHYSICAL EXAM
[Soft] : soft [Non-tender] : non-tender [Non-distended] : non-distended [No HSM] : No HSM [Normal] : normal [Absent] : absent

## 2022-06-21 NOTE — HISTORY OF PRESENT ILLNESS
[FreeTextEntry1] : PT PRESENTS FOR FOLLOW UP. STARTED ORILISSA. FEELS BETTER. INSURANCE WOULDNT COVER IT

## 2022-06-21 NOTE — REVIEW OF SYSTEMS
[Patient Intake Form Reviewed] : Patient intake form was reviewed [Frequency] : no frequency [Pelvic pain] : pelvic pain [Negative] : Heme/Lymph

## 2022-06-29 ENCOUNTER — NON-APPOINTMENT (OUTPATIENT)
Age: 32
End: 2022-06-29

## 2022-08-17 NOTE — ASU DISCHARGE PLAN (ADULT/PEDIATRIC) - PROVIDER TOKENS
I reached out to [employer] lead for outpatient manager  She gave me Alexandrea Sophia number for patient's wife to call to help get the Glucerna  Left message for wife and left her Evelyne's information  PROVIDER:[TOKEN:[[id number]]]

## 2022-09-13 NOTE — ASU PATIENT PROFILE, ADULT - TEACHING/LEARNING CULTURAL CONSIDERATIONS
Impression: Glaucoma: H40.9. Bilateral. Plan: Follow up with Dr. Franck Lopez for continued monitoring. none

## 2022-10-31 LAB — SARS-COV-2 N GENE NPH QL NAA+PROBE: NOT DETECTED

## 2022-11-01 ENCOUNTER — APPOINTMENT (OUTPATIENT)
Dept: PULMONOLOGY | Facility: CLINIC | Age: 32
End: 2022-11-01

## 2022-11-01 VITALS
SYSTOLIC BLOOD PRESSURE: 118 MMHG | DIASTOLIC BLOOD PRESSURE: 76 MMHG | HEART RATE: 80 BPM | RESPIRATION RATE: 16 BRPM | OXYGEN SATURATION: 96 %

## 2022-11-01 PROCEDURE — 94010 BREATHING CAPACITY TEST: CPT

## 2022-11-01 PROCEDURE — 99214 OFFICE O/P EST MOD 30 MIN: CPT | Mod: 25

## 2022-11-01 RX ORDER — AZITHROMYCIN 250 MG/1
250 TABLET, FILM COATED ORAL
Qty: 6 | Refills: 0 | Status: DISCONTINUED | COMMUNITY
Start: 2022-09-14 | End: 2022-11-01

## 2022-11-01 RX ORDER — SODIUM CHLORIDE/SODIUM BICARB
KIT NASAL
Qty: 1 | Refills: 0 | Status: DISCONTINUED | COMMUNITY
Start: 2022-01-11 | End: 2022-11-01

## 2022-11-01 RX ORDER — NABUMETONE 500 MG/1
500 TABLET, FILM COATED ORAL
Qty: 60 | Refills: 0 | Status: DISCONTINUED | COMMUNITY
Start: 2022-07-25 | End: 2022-11-01

## 2022-11-01 RX ORDER — MAGNESIUM CITRATE 1.75 G/29.6ML
1.75 LIQUID ORAL
Qty: 1 | Refills: 0 | Status: DISCONTINUED | COMMUNITY
Start: 2020-09-11 | End: 2022-11-01

## 2022-11-01 RX ORDER — ELAGOLIX 150 MG/1
150 TABLET, FILM COATED ORAL
Qty: 30 | Refills: 6 | Status: DISCONTINUED | COMMUNITY
Start: 2022-05-24 | End: 2022-11-01

## 2022-11-02 NOTE — CONSULT LETTER
[Dear  ___] : Dear  [unfilled], [Consult Letter:] : I had the pleasure of evaluating your patient, [unfilled]. [Please see my note below.] : Please see my note below. [Consult Closing:] : Thank you very much for allowing me to participate in the care of this patient.  If you have any questions, please do not hesitate to contact me. [Sincerely,] : Sincerely, [FreeTextEntry3] : Zelalem Burch MD FCCP\par Pulmonary/Critical Care/Sleep Medicine\par Department of Internal Medicine\par \par [facility]\par

## 2022-11-02 NOTE — HISTORY OF PRESENT ILLNESS
[Mild Persistent] : mild persistent [Doing Well] : doing well [Well Controlled] : Well controlled [None] : The patient is currently asymptomatic [___ Times a Week] : of [unfilled] time(s) a week [Adherent] : the patient is adherent with ~his/her~ medication regimen [de-identified] : sinusitis

## 2022-11-02 NOTE — HISTORY OF PRESENT ILLNESS
[Mild Persistent] : mild persistent [Doing Well] : doing well [Well Controlled] : Well controlled [None] : The patient is currently asymptomatic [___ Times a Week] : of [unfilled] time(s) a week [Adherent] : the patient is adherent with ~his/her~ medication regimen [de-identified] : sinusitis

## 2022-11-02 NOTE — CONSULT LETTER
[Dear  ___] : Dear  [unfilled], [Consult Letter:] : I had the pleasure of evaluating your patient, [unfilled]. [Please see my note below.] : Please see my note below. [Consult Closing:] : Thank you very much for allowing me to participate in the care of this patient.  If you have any questions, please do not hesitate to contact me. [Sincerely,] : Sincerely, [FreeTextEntry3] : Zelalem Burch MD FCCP\par Pulmonary/Critical Care/Sleep Medicine\par Department of Internal Medicine\par \par [facility]\par

## 2023-02-15 ENCOUNTER — APPOINTMENT (OUTPATIENT)
Dept: PULMONOLOGY | Facility: CLINIC | Age: 33
End: 2023-02-15
Payer: MEDICAID

## 2023-02-15 VITALS — WEIGHT: 206 LBS | BODY MASS INDEX: 40.44 KG/M2 | HEIGHT: 60 IN

## 2023-02-15 VITALS
OXYGEN SATURATION: 98 % | RESPIRATION RATE: 16 BRPM | DIASTOLIC BLOOD PRESSURE: 72 MMHG | SYSTOLIC BLOOD PRESSURE: 106 MMHG

## 2023-02-15 DIAGNOSIS — J32.9 CHRONIC SINUSITIS, UNSPECIFIED: ICD-10-CM

## 2023-02-15 PROCEDURE — 94010 BREATHING CAPACITY TEST: CPT

## 2023-02-15 PROCEDURE — 99214 OFFICE O/P EST MOD 30 MIN: CPT | Mod: 25

## 2023-02-15 NOTE — PROCEDURE
[FreeTextEntry1] : 2/25/2022: Pulmonary function test-normal\par 11/1/2022: Spirometry-normal\par 2/15/2023: Spirometry-normal

## 2023-02-15 NOTE — HISTORY OF PRESENT ILLNESS
[Mild Persistent] : mild persistent [Doing Well] : doing well [Well Controlled] : Well controlled [None] : The patient is currently asymptomatic [___ Times a Week] : of [unfilled] time(s) a week [Adherent] : the patient is adherent with ~his/her~ medication regimen [Cold] : cold weather [Cough at Night] : not awakened at night with cough [Wheezing at Night] : not awakened at night because of wheezing or trouble breathing [de-identified] : sinusitis [de-identified] : 0ne month ago chest tightness with cold weather

## 2023-03-08 NOTE — PLAN
Behavioral Health Psychotherapy Progress Note    Psychotherapy Provided: Individual Psychotherapy     1  Generalized anxiety disorder            Goals addressed in session: Goal 1     DATA: Martha Alonso engaged with the counselor around how things have been at home  She explained that things have been good at home and that she and Mac Orantes have had many conversations around their future and how Martha Alonso feels about the kids and being a parents  Martha Alonso explained that Mac Orantes handled the conversations better than [place] expected and explained that she was happy that they were able to come to a better understanding of things  Martha Alonso talked briefly about her goals for therapy and explained that she wants to start talking about more deeper topics and explained that she has been avoiding harder issues within her past  She agreed to make a list of topics and to bring it to the next session so that she and the counselor can begin to discuss them  During this session, this clinician used the following therapeutic modalities: Cognitive Behavioral Therapy    Substance Abuse was not addressed during this session  If the client is diagnosed with a co-occurring substance use disorder, please indicate any changes in the frequency or amount of use:   Stage of change for addressing substance use diagnoses: No substance use/Not applicable    ASSESSMENT:  Cuco Erickson presents with a Euthymic/ normal mood  her affect is Normal range and intensity, which is congruent, with her mood and the content of the session  The client has made progress on their goals  Martha Alonso was oriented and neatly dressed, her eye contact and speech were normal,  Cuco Erickson presents with a none risk of suicide, none risk of self-harm, and none risk of harm to others  For any risk assessment that surpasses a "low" rating, a safety plan must be developed      A safety plan was indicated: no  If yes, describe in detail     PLAN: Between sessions, Martha ANNE Rima Maldonado will work on self-care and continue with journaling and talking about deeper topics in session  At the next session, the therapist will use Cognitive Behavioral Therapy to address past trauma   Behavioral Health Treatment Plan and Discharge Planning: Nithin Prajapati is aware of and agrees to continue to work on their treatment plan  They have identified and are working toward their discharge goals   no    Visit start and stop times:    03/08/23  Start Time: 1200  Stop Time: 1245  Total Visit Time: 45 minutes [FreeTextEntry1] : PATIENT PRESENTS WITH URINARY INCONTINENCE. CLINICAL HISTORY REVIEWED. EXAMINATION PERFORMED. FINDINGS DISCUSSED. DISCUSSED TREATMENT OPTIONS WHICH INCLUDE MEDICAL THERAPY WITH ORAL ANTICHOLINERGICS, MYRBETIQ, BLADDER BOTOX, OR INTERSTIM. FOLLOWUP SCHEDULED

## 2023-03-16 ENCOUNTER — APPOINTMENT (OUTPATIENT)
Dept: OBGYN | Facility: CLINIC | Age: 33
End: 2023-03-16

## 2023-03-23 ENCOUNTER — NON-APPOINTMENT (OUTPATIENT)
Age: 33
End: 2023-03-23

## 2023-04-13 ENCOUNTER — APPOINTMENT (OUTPATIENT)
Dept: OBGYN | Facility: CLINIC | Age: 33
End: 2023-04-13
Payer: MEDICAID

## 2023-04-13 VITALS — BODY MASS INDEX: 40.23 KG/M2 | SYSTOLIC BLOOD PRESSURE: 110 MMHG | WEIGHT: 206 LBS | DIASTOLIC BLOOD PRESSURE: 68 MMHG

## 2023-04-13 PROCEDURE — 99213 OFFICE O/P EST LOW 20 MIN: CPT

## 2023-04-13 PROCEDURE — 81003 URINALYSIS AUTO W/O SCOPE: CPT | Mod: QW

## 2023-04-13 NOTE — REVIEW OF SYSTEMS
[Patient Intake Form Reviewed] : Patient intake form was reviewed [Pelvic pain] : pelvic pain [Negative] : Heme/Lymph [Frequency] : no frequency

## 2023-04-13 NOTE — PLAN
[FreeTextEntry1] : PT WITH RECENT HERPES OUTBREAK. DISCUSSED FINDINGS ON EXAM. DISCUSSED OPTIONS. WILL GIVE A TRIAL OF SUPPRESSION ON VALTREX. CONTINUE ORILISSA. FOLLOW UP 6 MONTHS

## 2023-04-13 NOTE — HISTORY OF PRESENT ILLNESS
[FreeTextEntry1] : PT DOING WELL ON ORILISSA BUT RECENTLY WAS DIAGNOSED WITH TYPE II HERPES. DISCUSSED INFECTION AND ROUTE OF TRANSMISSION

## 2023-04-20 LAB
BILIRUB UR QL STRIP: NORMAL
GLUCOSE UR-MCNC: NORMAL
HCG UR QL: 0.2 EU/DL
HGB UR QL STRIP.AUTO: NORMAL
KETONES UR-MCNC: NORMAL
LEUKOCYTE ESTERASE UR QL STRIP: NORMAL
NITRITE UR QL STRIP: NORMAL
PH UR STRIP: 7
PROT UR STRIP-MCNC: NORMAL
SP GR UR STRIP: 1.01

## 2023-05-15 ENCOUNTER — NON-APPOINTMENT (OUTPATIENT)
Age: 33
End: 2023-05-15

## 2023-06-05 ENCOUNTER — RX CHANGE (OUTPATIENT)
Age: 33
End: 2023-06-05

## 2023-06-06 ENCOUNTER — RX CHANGE (OUTPATIENT)
Age: 33
End: 2023-06-06

## 2023-07-10 ENCOUNTER — TRANSCRIPTION ENCOUNTER (OUTPATIENT)
Age: 33
End: 2023-07-10

## 2023-07-11 RX ORDER — FLUTICASONE FUROATE 200 UG/1
200 POWDER RESPIRATORY (INHALATION) DAILY
Qty: 3 | Refills: 3 | Status: ACTIVE | COMMUNITY
Start: 2022-02-25 | End: 1900-01-01

## 2023-10-05 ENCOUNTER — APPOINTMENT (OUTPATIENT)
Dept: OBGYN | Facility: CLINIC | Age: 33
End: 2023-10-05
Payer: MEDICAID

## 2023-10-05 VITALS
SYSTOLIC BLOOD PRESSURE: 120 MMHG | DIASTOLIC BLOOD PRESSURE: 78 MMHG | HEIGHT: 60 IN | BODY MASS INDEX: 40.25 KG/M2 | WEIGHT: 205 LBS

## 2023-10-05 DIAGNOSIS — Z01.419 ENCOUNTER FOR GYNECOLOGICAL EXAMINATION (GENERAL) (ROUTINE) W/OUT ABNORMAL FINDINGS: ICD-10-CM

## 2023-10-05 DIAGNOSIS — A60.04 HERPESVIRAL VULVOVAGINITIS: ICD-10-CM

## 2023-10-05 PROCEDURE — 82270 OCCULT BLOOD FECES: CPT

## 2023-10-05 PROCEDURE — 99395 PREV VISIT EST AGE 18-39: CPT

## 2023-10-05 RX ORDER — ELAGOLIX 150 MG/1
150 TABLET, FILM COATED ORAL
Qty: 90 | Refills: 3 | Status: ACTIVE | COMMUNITY
Start: 2022-06-21 | End: 1900-01-01

## 2023-10-05 RX ORDER — VALACYCLOVIR 1 G/1
1 TABLET, FILM COATED ORAL
Qty: 90 | Refills: 3 | Status: ACTIVE | COMMUNITY
Start: 2023-04-13 | End: 1900-01-01

## 2023-10-09 LAB — CYTOLOGY CVX/VAG DOC THIN PREP: NORMAL

## 2023-11-22 RX ORDER — ALBUTEROL SULFATE 90 UG/1
108 (90 BASE) AEROSOL, METERED RESPIRATORY (INHALATION)
Qty: 1 | Refills: 3 | Status: ACTIVE | COMMUNITY
Start: 2022-01-11 | End: 1900-01-01

## 2024-02-05 ENCOUNTER — APPOINTMENT (OUTPATIENT)
Dept: CARDIOLOGY | Facility: CLINIC | Age: 34
End: 2024-02-05
Payer: MEDICAID

## 2024-02-05 VITALS
DIASTOLIC BLOOD PRESSURE: 78 MMHG | HEIGHT: 60 IN | OXYGEN SATURATION: 97 % | SYSTOLIC BLOOD PRESSURE: 112 MMHG | WEIGHT: 214 LBS | BODY MASS INDEX: 42.01 KG/M2 | RESPIRATION RATE: 16 BRPM | HEART RATE: 80 BPM

## 2024-02-05 DIAGNOSIS — R07.89 OTHER CHEST PAIN: ICD-10-CM

## 2024-02-05 DIAGNOSIS — Z82.3 FAMILY HISTORY OF STROKE: ICD-10-CM

## 2024-02-05 DIAGNOSIS — E03.9 HYPOTHYROIDISM, UNSPECIFIED: ICD-10-CM

## 2024-02-05 DIAGNOSIS — M94.0 CHONDROCOSTAL JUNCTION SYNDROME [TIETZE]: ICD-10-CM

## 2024-02-05 DIAGNOSIS — Z82.49 FAMILY HISTORY OF ISCHEMIC HEART DISEASE AND OTHER DISEASES OF THE CIRCULATORY SYSTEM: ICD-10-CM

## 2024-02-05 DIAGNOSIS — Z78.9 OTHER SPECIFIED HEALTH STATUS: ICD-10-CM

## 2024-02-05 PROCEDURE — 93000 ELECTROCARDIOGRAM COMPLETE: CPT

## 2024-02-05 PROCEDURE — 99204 OFFICE O/P NEW MOD 45 MIN: CPT

## 2024-02-05 RX ORDER — PNV NO.95/FERROUS FUM/FOLIC AC 28MG-0.8MG
TABLET ORAL DAILY
Refills: 0 | Status: ACTIVE | COMMUNITY

## 2024-02-05 RX ORDER — MAGNESIUM OXIDE/MAG AA CHELATE 300 MG
CAPSULE ORAL
Refills: 0 | Status: ACTIVE | COMMUNITY

## 2024-02-05 RX ORDER — CHROMIUM 200 MCG
TABLET ORAL DAILY
Refills: 0 | Status: ACTIVE | COMMUNITY

## 2024-04-01 ENCOUNTER — APPOINTMENT (OUTPATIENT)
Dept: OBGYN | Facility: CLINIC | Age: 34
End: 2024-04-01

## 2024-04-15 ENCOUNTER — TRANSCRIPTION ENCOUNTER (OUTPATIENT)
Age: 34
End: 2024-04-15

## 2024-04-18 ENCOUNTER — TRANSCRIPTION ENCOUNTER (OUTPATIENT)
Age: 34
End: 2024-04-18

## 2024-04-22 ENCOUNTER — TRANSCRIPTION ENCOUNTER (OUTPATIENT)
Age: 34
End: 2024-04-22

## 2024-05-09 ENCOUNTER — APPOINTMENT (OUTPATIENT)
Dept: OBGYN | Facility: CLINIC | Age: 34
End: 2024-05-09
Payer: MEDICAID

## 2024-05-09 VITALS
SYSTOLIC BLOOD PRESSURE: 128 MMHG | BODY MASS INDEX: 42.41 KG/M2 | HEIGHT: 60 IN | DIASTOLIC BLOOD PRESSURE: 84 MMHG | WEIGHT: 216 LBS

## 2024-05-09 DIAGNOSIS — B37.31 ACUTE CANDIDIASIS OF VULVA AND VAGINA: ICD-10-CM

## 2024-05-09 PROCEDURE — 99214 OFFICE O/P EST MOD 30 MIN: CPT

## 2024-05-09 RX ORDER — HYDROCHLOROTHIAZIDE 12.5 MG/1
12.5 CAPSULE ORAL
Qty: 30 | Refills: 0 | Status: DISCONTINUED | COMMUNITY
Start: 2022-03-03 | End: 2024-05-09

## 2024-05-09 RX ORDER — AZELASTINE HYDROCHLORIDE AND FLUTICASONE PROPIONATE 137; 50 UG/1; UG/1
137-50 SPRAY, METERED NASAL
Qty: 1 | Refills: 5 | Status: DISCONTINUED | COMMUNITY
Start: 2022-01-11 | End: 2024-05-09

## 2024-05-09 RX ORDER — TERCONAZOLE 80 MG/1
80 SUPPOSITORY VAGINAL
Qty: 3 | Refills: 3 | Status: ACTIVE | COMMUNITY
Start: 2024-05-09 | End: 1900-01-01

## 2024-05-09 RX ORDER — MEDROXYPROGESTERONE ACETATE 150 MG/ML
150 INJECTION, SUSPENSION INTRAMUSCULAR
Qty: 1 | Refills: 3 | Status: ACTIVE | COMMUNITY
Start: 2024-05-09 | End: 1900-01-01

## 2024-05-09 NOTE — PHYSICAL EXAM
[Labia Majora] : normal [Labia Minora] : normal [Discharge] : a  ~M vaginal discharge was present [Moderate] : moderate [White] : white [Thick] : thick [Normal] : normal [Absent] : absent [Uterine Adnexae] : normal [Declined] : Patient declined rectal exam

## 2024-05-09 NOTE — PLAN
[FreeTextEntry1] : PT WITH VAGINAL YEAST INFECTION, ENDOMETRIOSIS. FINISHED 2YEARS ON ORILLISSA. WILL SWITCH TO DEPOPROVERA. RX SENT FOR TERAZOL 3. FOLLOW UP APPT MADE. 30 MINUTES SPENT IN ENCOUNTER

## 2024-05-09 NOTE — HISTORY OF PRESENT ILLNESS
[FreeTextEntry1] : PT AT 2 YEARS WITH ORILISSA. HAS BEEN HELPING. DISCUSSED BLEEDING AND DISCHARGE. DENIES PAIN.

## 2024-05-09 NOTE — REVIEW OF SYSTEMS
If provider orders tests at today's visit, patient would like to be contacted via Telephone.  If to contact patient by phone, patient's preferred phone # is [phone] (Cell) and it is OK to leave message on voice mail or with family member.  If medications are ordered at today's visit, the pharmacy name/location patient would like them to be sent to is   [id number] - [place], [place] - [address]  [address]  [facility] [address]  Phone: [phone] Fax: [phone]     [Negative] : Heme/Lymph [FreeTextEntry8] : VAGINAL DISCHARGE

## 2024-05-13 ENCOUNTER — APPOINTMENT (OUTPATIENT)
Dept: PULMONOLOGY | Facility: CLINIC | Age: 34
End: 2024-05-13
Payer: MEDICAID

## 2024-05-13 VITALS
DIASTOLIC BLOOD PRESSURE: 68 MMHG | HEART RATE: 54 BPM | OXYGEN SATURATION: 98 % | RESPIRATION RATE: 16 BRPM | SYSTOLIC BLOOD PRESSURE: 120 MMHG

## 2024-05-13 DIAGNOSIS — J45.30 MILD PERSISTENT ASTHMA, UNCOMPLICATED: ICD-10-CM

## 2024-05-13 DIAGNOSIS — J45.31 MILD PERSISTENT ASTHMA WITH (ACUTE) EXACERBATION: ICD-10-CM

## 2024-05-13 PROCEDURE — 99214 OFFICE O/P EST MOD 30 MIN: CPT

## 2024-05-13 PROCEDURE — G2211 COMPLEX E/M VISIT ADD ON: CPT | Mod: NC,1L

## 2024-05-13 RX ORDER — PREDNISONE 20 MG/1
20 TABLET ORAL DAILY
Qty: 10 | Refills: 0 | Status: ACTIVE | COMMUNITY
Start: 2024-05-13 | End: 1900-01-01

## 2024-05-13 RX ORDER — FLUTICASONE FUROATE 200 UG/1
200 POWDER RESPIRATORY (INHALATION) DAILY
Qty: 3 | Refills: 3 | Status: ACTIVE | COMMUNITY
Start: 2024-05-13 | End: 1900-01-01

## 2024-05-13 NOTE — DISCUSSION/SUMMARY
[Mild Persistent] : mild persistent [Patient] : the patient [Asthma, Acute Episode] : acute episode of asthma [Decompensated] : decompensated [Medication Changes Per Orders] : Medication changes are as documented in orders

## 2024-05-13 NOTE — CONSULT LETTER
[Dear  ___] : Dear  [unfilled], [Consult Letter:] : I had the pleasure of evaluating your patient, [unfilled]. [Please see my note below.] : Please see my note below. [Consult Closing:] : Thank you very much for allowing me to participate in the care of this patient.  If you have any questions, please do not hesitate to contact me. [Sincerely,] : Sincerely, [FreeTextEntry3] : Zelalem Burch MD FCCP\par  Pulmonary/Critical Care/Sleep Medicine\par  Department of Internal Medicine\par  \par  [facility]\par

## 2024-05-13 NOTE — HISTORY OF PRESENT ILLNESS
[Mild Persistent] : mild persistent [Doing Well] : doing well [None] : ~He/She~ has no significant interval events [___ Times a Week] : of [unfilled] time(s) a week [Cold] : cold weather [Not Well Controlled] : Not well controlled [Cough] : worsened coughing [Shortness Of Breath] : worsened shortness of breath [Chest Tightness Or Heavy Pressure] : worsened chest tightness [Feelings Of Weakness On Exertion] : denies reduced exercise tolerance [Cough at Night] : not awakened at night with cough [Wheezing at Night] : awakened at night because of wheezing or trouble breathing [Pollen] : pollen exposure [Adherent] : the patient is not adherent with ~his/her~ medication regimen [Not Taking due to Lack Of Access] : not taking the medication due to lack of access to a pharmacy [de-identified] : sinusitis [de-identified] : Developed increasing symptoms of chest tightness with the onset of spring. [FreeTextEntry3] : Ran out of of inhaled corticosteroid [TextBox_4] : Patient's last visit was in February 2023, chart review

## 2024-05-16 ENCOUNTER — APPOINTMENT (OUTPATIENT)
Dept: OBGYN | Facility: CLINIC | Age: 34
End: 2024-05-16
Payer: MEDICAID

## 2024-05-16 VITALS
HEIGHT: 60 IN | SYSTOLIC BLOOD PRESSURE: 120 MMHG | WEIGHT: 218 LBS | DIASTOLIC BLOOD PRESSURE: 78 MMHG | BODY MASS INDEX: 42.8 KG/M2

## 2024-05-16 DIAGNOSIS — N80.9 ENDOMETRIOSIS, UNSPECIFIED: ICD-10-CM

## 2024-05-16 LAB
HCG UR QL: NEGATIVE
QUALITY CONTROL: YES

## 2024-05-16 PROCEDURE — 81025 URINE PREGNANCY TEST: CPT

## 2024-05-16 PROCEDURE — 96372 THER/PROPH/DIAG INJ SC/IM: CPT

## 2024-05-16 PROCEDURE — 99213 OFFICE O/P EST LOW 20 MIN: CPT | Mod: 25

## 2024-05-17 NOTE — HISTORY OF PRESENT ILLNESS
[FreeTextEntry1] : 33 YO F PRESENTS FOR INITIAL DEPO PROVERA INJECTION. PT WITH HX OF ENDOMETRIOSIS- HAD PARTIAL HYSTERECTOMY AND WAS TAKING ORILLISSA X2 YRS AND IS NOW BEING SWITCHED TO DEPO FOR MAINTENANCE OF ENDOMETRIOSIS.   PT FEELS WELL TODAY AND OFFERS NO COMPLAINTS.

## 2024-05-17 NOTE — PLAN
[FreeTextEntry1] : 33 YO F PRESENTS FOR DEPO INJECTION SIDE EFFECTS OF INJECTION REVIEWED WITH PT  INJECTION GIVEN IN LEFT GLUTEAL MUSCLE AND TOLERATED WELL RETURN TO OFFICE IN 3 MONTHS FOR NEXT INJECTION OR PRN ALL QUESTIONS ANSWERED  LOT #: [id number] EXP: 07/2026 REVIEWED ENCOUNTER AND WILL FOLLOW UP IN 3 MONTHS. TG

## 2024-05-29 ENCOUNTER — OFFICE (OUTPATIENT)
Dept: URBAN - METROPOLITAN AREA CLINIC 115 | Facility: CLINIC | Age: 34
Setting detail: OPHTHALMOLOGY
End: 2024-05-29
Payer: MEDICARE

## 2024-05-29 DIAGNOSIS — H04.123: ICD-10-CM

## 2024-05-29 DIAGNOSIS — H02.824: ICD-10-CM

## 2024-05-29 PROBLEM — H10.45 ALLERGIC CONJUNCTIVITIS: Status: ACTIVE | Noted: 2024-05-29

## 2024-05-29 PROCEDURE — 92014 COMPRE OPH EXAM EST PT 1/>: CPT | Performed by: OPHTHALMOLOGY

## 2024-05-29 PROCEDURE — 83861 MICROFLUID ANALY TEARS: CPT | Performed by: OPHTHALMOLOGY

## 2024-05-29 ASSESSMENT — LID EXAM ASSESSMENTS
OS_BLEPHARITIS: LUL T 1+
OD_BLEPHARITIS: RUL T 1+

## 2024-05-29 ASSESSMENT — CONFRONTATIONAL VISUAL FIELD TEST (CVF)
OS_FINDINGS: FULL
OD_FINDINGS: FULL

## 2024-07-25 ENCOUNTER — APPOINTMENT (OUTPATIENT)
Dept: PULMONOLOGY | Facility: CLINIC | Age: 34
End: 2024-07-25
Payer: COMMERCIAL

## 2024-07-25 VITALS — WEIGHT: 211 LBS | HEIGHT: 60 IN | BODY MASS INDEX: 41.43 KG/M2

## 2024-07-25 VITALS
SYSTOLIC BLOOD PRESSURE: 118 MMHG | OXYGEN SATURATION: 97 % | HEART RATE: 66 BPM | DIASTOLIC BLOOD PRESSURE: 76 MMHG | RESPIRATION RATE: 16 BRPM

## 2024-07-25 DIAGNOSIS — J32.9 CHRONIC SINUSITIS, UNSPECIFIED: ICD-10-CM

## 2024-07-25 DIAGNOSIS — J45.30 MILD PERSISTENT ASTHMA, UNCOMPLICATED: ICD-10-CM

## 2024-07-25 PROCEDURE — 94010 BREATHING CAPACITY TEST: CPT

## 2024-07-25 PROCEDURE — 99214 OFFICE O/P EST MOD 30 MIN: CPT | Mod: 25

## 2024-07-25 NOTE — DISCUSSION/SUMMARY
[Asthma] : asthma [Stable] : stable [Mild Persistent] : mild persistent [Sinusitis] : sinusitis [Mild] : mild [Responding to Treatment] : responding to treatment [None] : There are no changes in medication management [Sinus Irrigation] : sinus irrigation [Patient] : discussed with the patient

## 2024-07-25 NOTE — HISTORY OF PRESENT ILLNESS
[Mild Persistent] : mild persistent [Doing Well] : doing well [Not Well Controlled] : Not well controlled [Wheezing] : denies wheezing [Cough] : denies coughing [Shortness Of Breath] : denies shortness of breath [Chest Tightness Or Heavy Pressure] : denies chest tightness [Feelings Of Weakness On Exertion] : denies reduced exercise tolerance [Wheezing at Night] : awakened at night because of wheezing or trouble breathing [___ Times a Week] : of [unfilled] time(s) a week [Cold] : cold weather [Pollen] : pollen exposure [Not Taking due to Lack Of Access] : not taking the medication due to lack of access to a pharmacy [Follow-Up - Routine Clinic] : a routine clinic follow-up of [None] : No associated symptoms are reported [Nasal Washes] : nasal washes [Good Compliance] : good compliance with treatment [Good Tolerance] : good tolerance of treatment [Good Symptom Control] : good symptom control [Cough at Night] : not awakened at night with cough [Adherent] : the patient is not adherent with ~his/her~ medication regimen [de-identified] : sinusitis [de-identified] : spring. [FreeTextEntry3] : Ran out of of inhaled corticosteroid [de-identified] : nasal steroids and antihistamines

## 2024-07-25 NOTE — CONSULT LETTER
[Dear  ___] : Dear  [unfilled], [Consult Letter:] : I had the pleasure of evaluating your patient, [unfilled]. [Please see my note below.] : Please see my note below. [Consult Closing:] : Thank you very much for allowing me to participate in the care of this patient.  If you have any questions, please do not hesitate to contact me. [Sincerely,] : Sincerely, [FreeTextEntry3] : Zelalem Burch MD FCCP\par  Pulmonary/Critical Care/Sleep Medicine\par  Department of Internal Medicine\par  \par  [facility]\par

## 2024-07-25 NOTE — PROCEDURE
[FreeTextEntry1] : 2/25/2022: Pulmonary function test-normal 11/1/2022: Spirometry-normal 2/15/2023: Spirometry-normal 7/25/2024: SPirometry-Normal

## 2024-08-07 ENCOUNTER — APPOINTMENT (OUTPATIENT)
Dept: OBGYN | Facility: CLINIC | Age: 34
End: 2024-08-07

## 2024-08-07 PROBLEM — Z30.42 ENCOUNTER FOR SURVEILLANCE OF INJECTABLE CONTRACEPTIVE: Status: ACTIVE | Noted: 2024-08-07

## 2024-08-07 PROCEDURE — 96372 THER/PROPH/DIAG INJ SC/IM: CPT

## 2024-08-07 PROCEDURE — 81025 URINE PREGNANCY TEST: CPT

## 2024-08-07 PROCEDURE — 99213 OFFICE O/P EST LOW 20 MIN: CPT | Mod: 25

## 2024-08-07 NOTE — PLAN
[FreeTextEntry1] : 35 YO F PRESENTS FOR DEPO INJECTION SIDE EFFECTS OF INJECTION REVIEWED WITH PT  INJECTION GIVEN IN LEFT GLUTEAL MUSCLE AND TOLERATED WELL RETURN TO OFFICE IN 3 MONTHS FOR NEXT INJECTION OR PRN ALL QUESTIONS ANSWERED  LOT #: [id number] EXP: 11/2026 [id number]

## 2024-08-07 NOTE — HISTORY OF PRESENT ILLNESS
[FreeTextEntry1] : 33 YO F PRESENTS FOR DEPO PROVERA INJECTION #2.  HX OF ENDOMETRIOSIS- HAD PARTIAL HYSTERECTOMY AND WAS TAKING ORILLISSA X2 YRS AND IS NOW ON DEPO FOR MAINTENANCE OF ENDO.  PT FEELS WELL TODAY AND OFFERS NO COMPLAINTS. STATES SHE IS STILL HAVING OCCASIONAL SPOTTING VERY LIGHT PINK FOR A FEW DAYS

## 2024-10-10 ENCOUNTER — APPOINTMENT (OUTPATIENT)
Dept: OBGYN | Facility: CLINIC | Age: 34
End: 2024-10-10
Payer: COMMERCIAL

## 2024-10-10 VITALS
WEIGHT: 215 LBS | BODY MASS INDEX: 42.21 KG/M2 | SYSTOLIC BLOOD PRESSURE: 110 MMHG | HEIGHT: 60 IN | DIASTOLIC BLOOD PRESSURE: 70 MMHG

## 2024-10-10 DIAGNOSIS — Z01.419 ENCOUNTER FOR GYNECOLOGICAL EXAMINATION (GENERAL) (ROUTINE) W/OUT ABNORMAL FINDINGS: ICD-10-CM

## 2024-10-10 PROCEDURE — 99395 PREV VISIT EST AGE 18-39: CPT

## 2024-10-10 RX ORDER — HYDROXYZINE HYDROCHLORIDE 50 MG/1
TABLET ORAL
Refills: 0 | Status: ACTIVE | COMMUNITY

## 2024-10-10 RX ORDER — MELOXICAM 10 MG/1
CAPSULE ORAL
Refills: 0 | Status: ACTIVE | COMMUNITY

## 2024-10-10 RX ORDER — SPIRONOLACTONE 50 MG/1
TABLET ORAL
Refills: 0 | Status: ACTIVE | COMMUNITY

## 2024-10-10 RX ORDER — CETIRIZINE HYDROCHLORIDE 10 MG/1
10 TABLET, FILM COATED ORAL
Refills: 0 | Status: ACTIVE | COMMUNITY

## 2024-10-18 LAB — CYTOLOGY CVX/VAG DOC THIN PREP: ABNORMAL

## 2024-10-23 PROBLEM — B37.9 YEAST INFECTION: Status: ACTIVE | Noted: 2024-10-23

## 2024-10-28 ENCOUNTER — RX RENEWAL (OUTPATIENT)
Age: 34
End: 2024-10-28

## 2024-11-07 ENCOUNTER — LABORATORY RESULT (OUTPATIENT)
Age: 34
End: 2024-11-07

## 2024-11-07 ENCOUNTER — APPOINTMENT (OUTPATIENT)
Dept: OBGYN | Facility: CLINIC | Age: 34
End: 2024-11-07
Payer: COMMERCIAL

## 2024-11-07 VITALS
HEIGHT: 60 IN | WEIGHT: 222 LBS | DIASTOLIC BLOOD PRESSURE: 70 MMHG | SYSTOLIC BLOOD PRESSURE: 103 MMHG | BODY MASS INDEX: 43.59 KG/M2

## 2024-11-07 DIAGNOSIS — R87.619 UNSPECIFIED ABNORMAL CYTOLOGICAL FINDINGS IN SPECIMENS FROM CERVIX UTERI: ICD-10-CM

## 2024-11-07 PROCEDURE — 57454 BX/CURETT OF CERVIX W/SCOPE: CPT

## 2024-11-11 ENCOUNTER — APPOINTMENT (OUTPATIENT)
Dept: OBGYN | Facility: CLINIC | Age: 34
End: 2024-11-11
Payer: COMMERCIAL

## 2024-11-11 VITALS
DIASTOLIC BLOOD PRESSURE: 60 MMHG | HEIGHT: 60 IN | SYSTOLIC BLOOD PRESSURE: 110 MMHG | BODY MASS INDEX: 42.8 KG/M2 | WEIGHT: 218 LBS

## 2024-11-11 DIAGNOSIS — Z30.42 ENCOUNTER FOR SURVEILLANCE OF INJECTABLE CONTRACEPTIVE: ICD-10-CM

## 2024-11-11 LAB — HCG UR QL: NEGATIVE

## 2024-11-11 PROCEDURE — 81025 URINE PREGNANCY TEST: CPT

## 2024-11-11 PROCEDURE — 96372 THER/PROPH/DIAG INJ SC/IM: CPT

## 2024-11-11 RX ORDER — MEDROXYPROGESTERONE ACETATE 150 MG/ML
150 INJECTION, SUSPENSION INTRAMUSCULAR
Refills: 0 | Status: COMPLETED | OUTPATIENT
Start: 2024-11-11

## 2024-11-18 ENCOUNTER — TRANSCRIPTION ENCOUNTER (OUTPATIENT)
Age: 34
End: 2024-11-18

## 2024-12-05 ENCOUNTER — APPOINTMENT (OUTPATIENT)
Dept: OBGYN | Facility: CLINIC | Age: 34
End: 2024-12-05
Payer: COMMERCIAL

## 2024-12-05 VITALS
BODY MASS INDEX: 43.98 KG/M2 | HEIGHT: 60 IN | SYSTOLIC BLOOD PRESSURE: 120 MMHG | WEIGHT: 224 LBS | DIASTOLIC BLOOD PRESSURE: 70 MMHG

## 2024-12-05 DIAGNOSIS — N87.9 DYSPLASIA OF CERVIX UTERI, UNSPECIFIED: ICD-10-CM

## 2024-12-05 LAB
HCG UR QL: NEGATIVE
QUALITY CONTROL: YES

## 2024-12-05 PROCEDURE — 57460 BX OF CERVIX W/SCOPE LEEP: CPT | Mod: 53

## 2024-12-05 RX ORDER — DIAZEPAM 10 MG/1
10 TABLET ORAL
Qty: 1 | Refills: 0 | Status: ACTIVE | COMMUNITY
Start: 2024-12-05 | End: 1900-01-01

## 2025-01-13 ENCOUNTER — LABORATORY RESULT (OUTPATIENT)
Age: 35
End: 2025-01-13

## 2025-01-13 ENCOUNTER — APPOINTMENT (OUTPATIENT)
Dept: OBGYN | Facility: CLINIC | Age: 35
End: 2025-01-13
Payer: COMMERCIAL

## 2025-01-13 VITALS
BODY MASS INDEX: 42.6 KG/M2 | WEIGHT: 217 LBS | HEIGHT: 60 IN | DIASTOLIC BLOOD PRESSURE: 70 MMHG | SYSTOLIC BLOOD PRESSURE: 110 MMHG

## 2025-01-13 DIAGNOSIS — R87.619 UNSPECIFIED ABNORMAL CYTOLOGICAL FINDINGS IN SPECIMENS FROM CERVIX UTERI: ICD-10-CM

## 2025-01-13 LAB
HCG UR QL: NEGATIVE
QUALITY CONTROL: YES

## 2025-01-13 PROCEDURE — 81025 URINE PREGNANCY TEST: CPT

## 2025-01-13 PROCEDURE — 57460 BX OF CERVIX W/SCOPE LEEP: CPT

## 2025-01-30 ENCOUNTER — APPOINTMENT (OUTPATIENT)
Dept: PULMONOLOGY | Facility: CLINIC | Age: 35
End: 2025-01-30
Payer: COMMERCIAL

## 2025-01-30 VITALS
RESPIRATION RATE: 16 BRPM | DIASTOLIC BLOOD PRESSURE: 72 MMHG | OXYGEN SATURATION: 97 % | SYSTOLIC BLOOD PRESSURE: 116 MMHG | HEIGHT: 60 IN | WEIGHT: 212 LBS | BODY MASS INDEX: 41.62 KG/M2 | HEART RATE: 85 BPM

## 2025-01-30 DIAGNOSIS — J45.30 MILD PERSISTENT ASTHMA, UNCOMPLICATED: ICD-10-CM

## 2025-01-30 DIAGNOSIS — K21.9 GASTRO-ESOPHAGEAL REFLUX DISEASE W/OUT ESOPHAGITIS: ICD-10-CM

## 2025-01-30 DIAGNOSIS — J32.9 CHRONIC SINUSITIS, UNSPECIFIED: ICD-10-CM

## 2025-01-30 PROCEDURE — 99214 OFFICE O/P EST MOD 30 MIN: CPT

## 2025-01-30 PROCEDURE — G2211 COMPLEX E/M VISIT ADD ON: CPT

## 2025-02-10 ENCOUNTER — APPOINTMENT (OUTPATIENT)
Dept: OBGYN | Facility: CLINIC | Age: 35
End: 2025-02-10
Payer: COMMERCIAL

## 2025-02-10 VITALS
HEIGHT: 60 IN | DIASTOLIC BLOOD PRESSURE: 70 MMHG | SYSTOLIC BLOOD PRESSURE: 116 MMHG | WEIGHT: 216 LBS | BODY MASS INDEX: 42.41 KG/M2

## 2025-02-10 DIAGNOSIS — N80.9 ENDOMETRIOSIS, UNSPECIFIED: ICD-10-CM

## 2025-02-10 DIAGNOSIS — N87.9 DYSPLASIA OF CERVIX UTERI, UNSPECIFIED: ICD-10-CM

## 2025-02-10 PROCEDURE — 99213 OFFICE O/P EST LOW 20 MIN: CPT

## 2025-02-10 RX ORDER — SUMATRIPTAN 100 MG/1
100 TABLET, FILM COATED ORAL
Qty: 9 | Refills: 3 | Status: ACTIVE | COMMUNITY
Start: 2025-02-10 | End: 1900-01-01

## 2025-02-10 NOTE — ASU PATIENT PROFILE, ADULT - MEDICATION ADMINISTRATION INFO, PROFILE
well developed, well nourished , in no acute distress , ambulating without difficulty , normal communication ability no concerns

## 2025-02-11 ENCOUNTER — APPOINTMENT (OUTPATIENT)
Dept: PHYSICAL MEDICINE AND REHAB | Facility: CLINIC | Age: 35
End: 2025-02-11
Payer: COMMERCIAL

## 2025-02-11 VITALS — HEIGHT: 61 IN | WEIGHT: 210 LBS | BODY MASS INDEX: 39.65 KG/M2

## 2025-02-11 PROCEDURE — 95909 NRV CNDJ TST 5-6 STUDIES: CPT
